# Patient Record
Sex: FEMALE | Race: BLACK OR AFRICAN AMERICAN | Employment: OTHER | URBAN - METROPOLITAN AREA
[De-identification: names, ages, dates, MRNs, and addresses within clinical notes are randomized per-mention and may not be internally consistent; named-entity substitution may affect disease eponyms.]

---

## 2018-09-19 ENCOUNTER — HOSPITAL ENCOUNTER (OUTPATIENT)
Dept: RESPIRATORY THERAPY | Age: 73
Discharge: HOME | End: 2018-09-19
Attending: FAMILY MEDICINE
Payer: MEDICARE

## 2018-09-19 DIAGNOSIS — R06.00 DYSPNEA AND RESPIRATORY ABNORMALITY: ICD-10-CM

## 2018-09-19 DIAGNOSIS — R06.89 DYSPNEA AND RESPIRATORY ABNORMALITY: ICD-10-CM

## 2018-09-19 PROCEDURE — 94726 PLETHYSMOGRAPHY LUNG VOLUMES: CPT

## 2018-09-19 PROCEDURE — 94729 DIFFUSING CAPACITY: CPT

## 2018-09-19 PROCEDURE — 94010 BREATHING CAPACITY TEST: CPT

## 2019-01-10 ENCOUNTER — OFFICE VISIT (OUTPATIENT)
Dept: ORTHOPEDIC SURGERY | Facility: CLINIC | Age: 74
End: 2019-01-10

## 2019-01-10 VITALS
SYSTOLIC BLOOD PRESSURE: 131 MMHG | WEIGHT: 187 LBS | RESPIRATION RATE: 16 BRPM | TEMPERATURE: 97.5 F | HEIGHT: 66 IN | DIASTOLIC BLOOD PRESSURE: 84 MMHG | HEART RATE: 82 BPM | BODY MASS INDEX: 30.05 KG/M2 | OXYGEN SATURATION: 99 %

## 2019-01-10 DIAGNOSIS — M25.561 RIGHT KNEE PAIN, UNSPECIFIED CHRONICITY: ICD-10-CM

## 2019-01-10 DIAGNOSIS — M17.5 OTHER SECONDARY OSTEOARTHRITIS OF RIGHT KNEE: Primary | ICD-10-CM

## 2019-01-10 DIAGNOSIS — M06.9 RHEUMATOID ARTHRITIS INVOLVING RIGHT KNEE, UNSPECIFIED RHEUMATOID FACTOR PRESENCE: ICD-10-CM

## 2019-01-10 RX ORDER — FLUCONAZOLE 150 MG/1
TABLET ORAL
COMMUNITY
Start: 2018-11-01

## 2019-01-10 RX ORDER — ZOLPIDEM TARTRATE 10 MG/1
TABLET ORAL
Refills: 1 | COMMUNITY
Start: 2018-12-21

## 2019-01-10 RX ORDER — NIFEDIPINE 30 MG/1
TABLET, FILM COATED, EXTENDED RELEASE ORAL
COMMUNITY
Start: 2019-01-10

## 2019-01-10 RX ORDER — DIPHENHYDRAMINE HCL 25 MG
25 CAPSULE ORAL
COMMUNITY

## 2019-01-10 RX ORDER — TRIAMCINOLONE ACETONIDE 1 MG/G
CREAM TOPICAL
COMMUNITY
Start: 2018-12-27

## 2019-01-10 RX ORDER — BETAMETHASONE SODIUM PHOSPHATE AND BETAMETHASONE ACETATE 3; 3 MG/ML; MG/ML
6 INJECTION, SUSPENSION INTRA-ARTICULAR; INTRALESIONAL; INTRAMUSCULAR; SOFT TISSUE ONCE
Qty: 0.5 ML | Refills: 0
Start: 2019-01-10 | End: 2019-01-10

## 2019-01-10 RX ORDER — ATORVASTATIN CALCIUM 20 MG/1
TABLET, FILM COATED ORAL
COMMUNITY
Start: 2018-11-01

## 2019-01-10 RX ORDER — BUPIVACAINE HYDROCHLORIDE 2.5 MG/ML
4 INJECTION, SOLUTION EPIDURAL; INFILTRATION; INTRACAUDAL ONCE
Qty: 4 ML | Refills: 0
Start: 2019-01-10 | End: 2019-01-10

## 2019-01-10 RX ORDER — PANTOPRAZOLE SODIUM 40 MG/1
TABLET, DELAYED RELEASE ORAL
COMMUNITY
Start: 2018-12-11

## 2019-01-10 RX ORDER — ASPIRIN 81 MG/1
TABLET ORAL DAILY
COMMUNITY

## 2019-01-10 RX ORDER — NABUMETONE 750 MG/1
TABLET, FILM COATED ORAL
Refills: 1 | COMMUNITY
Start: 2018-10-15

## 2019-01-10 RX ORDER — METRONIDAZOLE 500 MG/1
TABLET ORAL
COMMUNITY
Start: 2018-11-01

## 2019-01-10 RX ORDER — NAPROXEN 375 MG/1
TABLET ORAL
COMMUNITY
Start: 2018-10-23

## 2019-01-10 RX ORDER — METHYLPREDNISOLONE 4 MG/1
TABLET ORAL
COMMUNITY
Start: 2018-12-13 | End: 2019-03-25 | Stop reason: ALTCHOICE

## 2019-01-10 NOTE — PROGRESS NOTES
Patient: Chelsea Bah                MRN: [id number]       SSN: [id number] YOB: 1945        AGE: 75 y.o. SEX: female PCP: Nohemy Canada MD 
01/10/19 Chief Complaint Patient presents with  Knee Pain Right knee pain HISTORY:  Chelsea Bah is a 75 y.o. female who is seen for right knee pain. She has been experiencing right knee pain for the past few years. She reports no h/o injury. Sometimes her swelling and stiffness is so severe that she cannot walk. She reports relief with elevation. She has pain ascending/descending stairs. She reports no difficulty with everyday activities. She has trouble rising out of a chair. She has pain with getting in and out of a car. She has significant startup pain. She is s/p left knee arthroscopy in 2006 with significant relief. She was previously seen for right shoulder pain. The pain has been ongoing for several months. She was receiving cortisone from her PCP Dr. Javier Ortiz. She has a h/o of RA. She is currently taking methotrexate for RA prescribed by Dr. Shruti Mercado. She has a sharp pain that runs down her arm into her hand. She has pain with movement. Pain Assessment  1/10/2019 Location of Pain Knee Location Modifiers Right Severity of Pain 2 Quality of Pain Grinding; Fronie Liborio; Aching Duration of Pain Persistent Frequency of Pain Constant Aggravating Factors Bending;Walking;Standing Limiting Behavior Yes Relieving Factors Nothing Result of Injury No  
 
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 She lives in [place] with her . She has not been exercising recently. She is right handed. She has an adult daughter. She has 2 grandsons, one grand daughter, and 2 great grand daughters. She recently gained 4 lbs. Last 3 Recorded Weights in this Encounter 01/10/19 1344 Weight: 187 lb (84.8 kg) Body mass index is 30.18 kg/m². Patient Active Problem List  
Diagnosis Code  Abdominal pain R10.9  GERD (gastroesophageal reflux disease) K21.9 REVIEW OF SYSTEMS: All Below are Negative except: See HPI Constitutional: negative for fever, chills, and weight loss. Cardiovascular: negative for chest pain, claudication, leg swelling, SOB, CHEN Gastrointestinal: Negative for pain, N/V/C/D, Blood in stool or urine, dysuria,  hematuria, incontinence, pelvic pain. Musculoskeletal: See HPI Neurological: Negative for dizziness and weakness. Negative for headaches, Visual changes, confusion, seizures Phychiatric/Behavioral: Negative for depression, memory loss, substance  abuse. Extremities: Negative for hair changes, rash, or skin lesion changes. Hematologic: Negative for bleeding problems, bruising, pallor or swollen lymph  nodes Peripheral Vascular: No calf pain, no circulation deficits. Social History Socioeconomic History  Marital status: SINGLE Spouse name: Not on file  Number of children: Not on file  Years of education: Not on file  Highest education level: Not on file Social Needs  Financial resource strain: Not on file  Food insecurity - worry: Not on file  Food insecurity - inability: Not on file  Transportation needs - medical: Not on file  Transportation needs - non-medical: Not on file Occupational History  Not on file Tobacco Use  Smoking status: Never Smoker  Smokeless tobacco: Never Used Substance and Sexual Activity  Alcohol use: Yes Alcohol/week: 0.0 oz  Drug use: Not on file  Sexual activity: Not on file Other Topics Concern  Not on file Social History Narrative  Not on file Allergies Allergen Reactions  Pcn [Penicillins] Unknown (comments)  Sulfa (Sulfonamide Antibiotics) Unknown (comments) Current Outpatient Medications Medication Sig  
  atorvastatin (LIPITOR) 20 mg tablet  nabumetone (RELAFEN) 750 mg tablet TAKE 1 TABLET BY MOUTH EVERY 12 HOURS  zolpidem (AMBIEN) 10 mg tablet TAKE 1 TABLET BY MOUTH EVERY DAY AS NEEDED FOR INSOMNIA  pantoprazole (PROTONIX) 40 mg tablet  NIFEdipine ER (ADALAT CC) 30 mg ER tablet  diphenhydrAMINE (BENADRYL) 25 mg capsule Take 25 mg by mouth every six (6) hours as needed.  aspirin delayed-release 81 mg tablet Take  by mouth daily.  betamethasone (CELESTONE SOLUSPAN) 6 mg/mL injection 1 mL by Intra artICUlar route once for 1 dose.  bupivacaine, PF, (MARCAINE, PF,) 0.25 % (2.5 mg/mL) injection 4 mL by Intra artICUlar route once for 1 dose.  ranitidine (ZANTAC) 150 mg tablet Take 150 mg by mouth two (2) times a day.  folic acid (FOLVITE) 1 mg tablet Take  by mouth daily.  methotrexate (RHEUMATREX) 2.5 mg tablet Take 2.5 mg by mouth.  DULoxetine (CYMBALTA) 30 mg capsule Take 30 mg by mouth daily.  fluconazole (DIFLUCAN) 150 mg tablet  methylPREDNISolone (MEDROL DOSEPACK) 4 mg tablet  metroNIDAZOLE (FLAGYL) 500 mg tablet  naproxen (NAPROSYN) 375 mg tablet  triamcinolone acetonide (KENALOG) 0.1 % topical cream   
 dicyclomine (BENTYL) 20 mg tablet 1 tab po q 6 h prn abdominal pain  HYDROcodone-acetaminophen (NORCO) 5-325 mg per tablet Take 1-2 tablets PO every 4-6 hours as needed for pain control. If over the counter ibuprofen or acetaminophen was suggested, then only take the vicodin for pain not well controlled with the over the counter medication.  buPROPion (WELLBUTRIN) 100 mg tablet Take 100 mg by mouth.  HYDROcodone-acetaminophen (LORTAB) 7.5-500 mg per tablet Take 1 Tab by mouth every four (4) hours as needed for Pain. No current facility-administered medications for this visit. PHYSICAL EXAMINATION: 
Visit Vitals /84 (BP 1 Location: Left arm, BP Patient Position: Sitting) Pulse 82 Temp 97.5 °F (36.4 °C) (Oral) Resp 16 Ht 5' 6\" (1.676 m) Wt 187 lb (84.8 kg) SpO2 99% BMI 30.18 kg/m² ORTHO EXAMINATION: 
 
Examination Right shoulder Skin Intact Effusion - Biceps deformity - Atrophy -  
AC joint tenderness - Acromial tenderness + Biceps tenderness - Forward flexion/Elevation  Active abduction  External rotation ROM 10 Internal rotation ROM 70 Apprehension - Impingement -  
Drop Arm Test -  
Neurovascular Intact Examination Right knee Left knee Skin Intact 2 well healed barely visible arthroscopy portals Range of motion 120-0 120-0 Effusion - - Medial joint line tenderness + - Lateral joint line tenderness + - Popliteal tenderness - -  
Osteophytes palpable + - Nicolass - - Patella crepitus + - Anterior drawer - - Lateral laxity - - Medial laxity - - Varus deformity - -  
Valgus deformity - - Pretibial edema - - Calf tenderness - - Diffuse enlargement of right knee PROCEDURE: Patient's right knee was injected with 4 cc Marcaine and 1/2 cc Celestone. Chart reviewed for the following: 
 José Zavala MD, have reviewed the History, Physical and updated the Allergic reactions for [facility] TIME OUT performed immediately prior to start of procedure: 
José Zavala MD, have performed the following reviews on [facility] prior to the start of the procedure: 
         
* Patient was identified by name and date of birth * Agreement on procedure being performed was verified * Risks and Benefits explained to the patient * Procedure site verified and marked as necessary * Patient was positioned for comfort * Consent was obtained Time: 2:12 PM  
 
Date of procedure: 1/10/2019 Procedure performed by:  Christine Lara MD 
 
MsMikala Betty Bingham tolerated the procedure well with no complications. RADIOGRAPHS:   
XR RIGHT KNEE 1/10/19 IVONNE IMPRESSION:  Three views - No fractures, no effusion, moderately severe lateral especially on tunnel view joint space narrowing, + osteophytes present. IKDC Grade C 
 
XR RIGHT SHOULDER PREVIOUS Three views of right shoulder: no fractures, acromioclavicular narrowing, no glenohumeral narrowing,calcific densities. Katie Loose IMPRESSION:   
  ICD-10-CM ICD-9-CM 1. Other secondary osteoarthritis of right knee M17.5 715.26 betamethasone (CELESTONE SOLUSPAN) 6 mg/mL injection BETAMETHASONE ACETATE & SODIUM PHOSPHATE INJECTION 3 MG EA.  
   DRAIN/INJECT LARGE JOINT/BURSA  
   bupivacaine, PF, (MARCAINE, PF,) 0.25 % (2.5 mg/mL) injection 2. Right knee pain, unspecified chronicity M25.561 719.46 AMB POC X-RAY KNEE 3 VIEW  
   betamethasone (CELESTONE SOLUSPAN) 6 mg/mL injection BETAMETHASONE ACETATE & SODIUM PHOSPHATE INJECTION 3 MG EA.  
   DRAIN/INJECT LARGE JOINT/BURSA  
   bupivacaine, PF, (MARCAINE, PF,) 0.25 % (2.5 mg/mL) injection 3. Rheumatoid arthritis involving right knee, unspecified rheumatoid factor presence (HCC) M06.9 714.0 betamethasone (CELESTONE SOLUSPAN) 6 mg/mL injection BETAMETHASONE ACETATE & SODIUM PHOSPHATE INJECTION 3 MG EA.  
   DRAIN/INJECT LARGE JOINT/BURSA  
   bupivacaine, PF, (MARCAINE, PF,) 0.25 % (2.5 mg/mL) injection PLAN:  Patient's right knee was injected with 4 cc Marcaine and 1/2 cc Celestone. There is no need for surgery at this time. Consider visco supplementation if pain continues. She will follow up as needed.   
 
Scribed by Fany Villegas ([address]) as dictated by Eric Cevallos MD

## 2019-02-08 ENCOUNTER — OFFICE VISIT (OUTPATIENT)
Dept: ORTHOPEDIC SURGERY | Facility: CLINIC | Age: 74
End: 2019-02-08

## 2019-02-08 VITALS
HEIGHT: 66 IN | SYSTOLIC BLOOD PRESSURE: 110 MMHG | WEIGHT: 184 LBS | RESPIRATION RATE: 16 BRPM | HEART RATE: 120 BPM | TEMPERATURE: 98.4 F | OXYGEN SATURATION: 97 % | BODY MASS INDEX: 29.57 KG/M2 | DIASTOLIC BLOOD PRESSURE: 81 MMHG

## 2019-02-08 DIAGNOSIS — M06.9 RHEUMATOID ARTHRITIS INVOLVING RIGHT KNEE, UNSPECIFIED RHEUMATOID FACTOR PRESENCE: ICD-10-CM

## 2019-02-08 DIAGNOSIS — M19.011 PRIMARY OSTEOARTHRITIS OF RIGHT SHOULDER: ICD-10-CM

## 2019-02-08 DIAGNOSIS — G89.29 CHRONIC RIGHT SHOULDER PAIN: ICD-10-CM

## 2019-02-08 DIAGNOSIS — M54.2 NECK PAIN: ICD-10-CM

## 2019-02-08 DIAGNOSIS — M75.51 ACUTE BURSITIS OF RIGHT SHOULDER: ICD-10-CM

## 2019-02-08 DIAGNOSIS — M54.12 CERVICAL RADICULOPATHY: Primary | ICD-10-CM

## 2019-02-08 DIAGNOSIS — H93.11 TINNITUS OF RIGHT EAR: ICD-10-CM

## 2019-02-08 DIAGNOSIS — M17.5 OTHER SECONDARY OSTEOARTHRITIS OF RIGHT KNEE: ICD-10-CM

## 2019-02-08 DIAGNOSIS — M25.561 RIGHT KNEE PAIN, UNSPECIFIED CHRONICITY: ICD-10-CM

## 2019-02-08 DIAGNOSIS — M25.511 CHRONIC RIGHT SHOULDER PAIN: ICD-10-CM

## 2019-02-08 RX ORDER — BETAMETHASONE SODIUM PHOSPHATE AND BETAMETHASONE ACETATE 3; 3 MG/ML; MG/ML
6 INJECTION, SUSPENSION INTRA-ARTICULAR; INTRALESIONAL; INTRAMUSCULAR; SOFT TISSUE ONCE
Qty: 0.5 ML | Refills: 0
Start: 2019-02-08 | End: 2019-02-08

## 2019-02-08 RX ORDER — BUPIVACAINE HYDROCHLORIDE 2.5 MG/ML
4 INJECTION, SOLUTION EPIDURAL; INFILTRATION; INTRACAUDAL ONCE
Qty: 4 ML | Refills: 0
Start: 2019-02-08 | End: 2019-02-08

## 2019-02-08 NOTE — PROGRESS NOTES
Patient: Radames Guillen                MRN: [id number]       SSN: [id number] YOB: 1945        AGE: 75 y.o. SEX: female PCP: Senia Jack MD 
02/08/19 CC: RIGHT SHOULDER AND NECK PAIN 
 
HISTORY:  Radames Guillen is a 75 y.o. female who is seen for right shoulder pain. Her pain radiates from her neck into her right shoulder and R UE for the past year. She received a prior cortisone from her PCP Dr. Nena Camacho. She has a h/o of RA for which she takes methotrexate prescribed by Dr. Cristal Baldwin. She has a sharp pain that runs down her arm into her hand. She has pain with movement. She notes shoulder pain with overhead activities and at night. She has pain and difficulty with routine daily activities. She is right handed. She was previously seen for right knee pain. She has been experiencing right knee pain for the past few years. She reports no h/o injury. Sometimes her swelling and stiffness is so severe that she cannot walk. She reports relief with elevation. She has pain ascending/descending stairs. She reports no difficulty with everyday activities. She has trouble rising out of a chair. She has pain with getting in and out of a car. She has significant startup pain. She is s/p left knee arthroscopy in 2006 with significant relief. Pain Assessment  2/8/2019 Location of Pain Knee Location Modifiers Right Severity of Pain 2 Quality of Pain Dull;Aching Duration of Pain A few minutes Frequency of Pain Several times daily Aggravating Factors Walking;Standing Limiting Behavior No  
Relieving Factors Rest  
Result of Injury No  
 
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 She lives in [place] with her . She has not been exercising recently. She is right handed. She has an adult daughter.  She has 2 grandsons, one grand daughter, and 2 great grand daughters. She recently gained 4 lbs. Last 3 Recorded Weights in this Encounter 02/08/19 1455 Weight: 184 lb (83.5 kg) Body mass index is 29.7 kg/m². Patient Active Problem List  
Diagnosis Code  Abdominal pain R10.9  GERD (gastroesophageal reflux disease) K21.9 REVIEW OF SYSTEMS: All Below are Negative except: See HPI Constitutional: negative for fever, chills, and weight loss. Cardiovascular: negative for chest pain, claudication, leg swelling, SOB, CHEN Gastrointestinal: Negative for pain, N/V/C/D, Blood in stool or urine, dysuria,  hematuria, incontinence, pelvic pain. Musculoskeletal: See HPI Neurological: Negative for dizziness and weakness. Negative for headaches, Visual changes, confusion, seizures Phychiatric/Behavioral: Negative for depression, memory loss, substance  abuse. Extremities: Negative for hair changes, rash, or skin lesion changes. Hematologic: Negative for bleeding problems, bruising, pallor or swollen lymph  nodes Peripheral Vascular: No calf pain, no circulation deficits. Social History Socioeconomic History  Marital status: SINGLE Spouse name: Not on file  Number of children: Not on file  Years of education: Not on file  Highest education level: Not on file Social Needs  Financial resource strain: Not on file  Food insecurity - worry: Not on file  Food insecurity - inability: Not on file  Transportation needs - medical: Not on file  Transportation needs - non-medical: Not on file Occupational History  Not on file Tobacco Use  Smoking status: Never Smoker  Smokeless tobacco: Never Used Substance and Sexual Activity  Alcohol use: Yes Alcohol/week: 0.0 oz  Drug use: Not on file  Sexual activity: Not on file Other Topics Concern  Not on file Social History Narrative  Not on file Allergies Allergen Reactions  Pcn [Penicillins] Unknown (comments)  Sulfa (Sulfonamide Antibiotics) Unknown (comments) Current Outpatient Medications Medication Sig  
 atorvastatin (LIPITOR) 20 mg tablet  nabumetone (RELAFEN) 750 mg tablet TAKE 1 TABLET BY MOUTH EVERY 12 HOURS  naproxen (NAPROSYN) 375 mg tablet  zolpidem (AMBIEN) 10 mg tablet TAKE 1 TABLET BY MOUTH EVERY DAY AS NEEDED FOR INSOMNIA  triamcinolone acetonide (KENALOG) 0.1 % topical cream   
 pantoprazole (PROTONIX) 40 mg tablet  NIFEdipine ER (ADALAT CC) 30 mg ER tablet  diphenhydrAMINE (BENADRYL) 25 mg capsule Take 25 mg by mouth every six (6) hours as needed.  aspirin delayed-release 81 mg tablet Take  by mouth daily.  ranitidine (ZANTAC) 150 mg tablet Take 150 mg by mouth two (2) times a day.  folic acid (FOLVITE) 1 mg tablet Take  by mouth daily.  buPROPion (WELLBUTRIN) 100 mg tablet Take 100 mg by mouth.  methotrexate (RHEUMATREX) 2.5 mg tablet Take 2.5 mg by mouth.  DULoxetine (CYMBALTA) 30 mg capsule Take 30 mg by mouth daily.  HYDROcodone-acetaminophen (LORTAB) 7.5-500 mg per tablet Take 1 Tab by mouth every four (4) hours as needed for Pain.  fluconazole (DIFLUCAN) 150 mg tablet  methylPREDNISolone (MEDROL DOSEPACK) 4 mg tablet  metroNIDAZOLE (FLAGYL) 500 mg tablet  dicyclomine (BENTYL) 20 mg tablet 1 tab po q 6 h prn abdominal pain  HYDROcodone-acetaminophen (NORCO) 5-325 mg per tablet Take 1-2 tablets PO every 4-6 hours as needed for pain control. If over the counter ibuprofen or acetaminophen was suggested, then only take the vicodin for pain not well controlled with the over the counter medication. No current facility-administered medications for this visit. PHYSICAL EXAMINATION: 
Visit Vitals /81 (BP 1 Location: Left arm, BP Patient Position: Sitting) Pulse (!) 120 Comment: manual and machine Temp 98.4 °F (36.9 °C) (Oral) Resp 16 Ht 5' 6\" (1.676 m) Wt 184 lb (83.5 kg) SpO2 97% BMI 29.70 kg/m² ORTHO EXAMINATION: 
 
Examination Right shoulder Skin Intact Effusion - Biceps deformity - Atrophy -  
AC joint tenderness - Acromial tenderness + Biceps tenderness - Forward flexion/Elevation  Active abduction  External rotation ROM 10 Internal rotation ROM 70 Apprehension - Impingement -  
Drop Arm Test -  
Neurovascular Intact Trouble getting shoulder out of shirt Examination Neck Skin Intact Tenderness + right paracervical  
Tightness + Flexion Decreased 25% Extension Decreased 25% Lateral bend left Normal  
Lateral bend right Normal  
Masses - Biceps reflex Normal  
Triceps reflex Normal  
Brachioradialis reflex Normal  
 
Examination Right knee Left knee Skin Intact 2 well healed barely visible arthroscopy portals Range of motion 120-0 120-0 Effusion - - Medial joint line tenderness + - Lateral joint line tenderness + - Popliteal tenderness - -  
Osteophytes palpable + - Nicolass - - Patella crepitus + - Anterior drawer - - Lateral laxity - - Medial laxity - - Varus deformity - -  
Valgus deformity - - Pretibial edema - - Calf tenderness - - Diffuse enlargement of right knee Chart reviewed for the following: 
 Shefali Potts MD, have reviewed the History, Physical and updated the Allergic reactions for [facility] TIME OUT performed immediately prior to start of procedure: 
Shefali Potts MD, have performed the following reviews on [facility] prior to the start of the procedure: 
         
* Patient was identified by name and date of birth * Agreement on procedure being performed was verified * Risks and Benefits explained to the patient * Procedure site verified and marked as necessary * Patient was positioned for comfort * Consent was obtained Time: 2:12 PM  
 
Date of procedure: 2/8/2019 Procedure performed by:  Babatunde Nesbitt MD 
 
 Ms. Meri Awad tolerated the procedure well with no complications. RADIOGRAPHS:   
XR RIGHT KNEE 1/10/19 IVONNE IMPRESSION:  Three views - No fractures, no effusion, moderately severe lateral especially on tunnel view joint space narrowing, + osteophytes present. IKDC Grade C 
 
XR RIGHT SHOULDER IVONNE 2/19/16 Three views of right shoulder: no fractures, acromioclavicular narrowing, no glenohumeral narrowing,calcific densities. IMPRESSION:   
  ICD-10-CM ICD-9-CM 1. Cervical radiculopathy M54.12 723.4 REFERRAL TO SPINE SURGERY  
   betamethasone (CELESTONE SOLUSPAN) 6 mg/mL injection BETAMETHASONE ACETATE & SODIUM PHOSPHATE INJECTION 3 MG EA. THER/PROPH/DIAG INJECTION, SUBCUT/IM  
   bupivacaine, PF, (MARCAINE, PF,) 0.25 % (2.5 mg/mL) injection REFERRAL TO PHYSICAL THERAPY 2. Other secondary osteoarthritis of right knee M17.5 715.26 REFERRAL TO PHYSICAL THERAPY 3. Rheumatoid arthritis involving right knee, unspecified rheumatoid factor presence (HCC) M06.9 714.0 REFERRAL TO PHYSICAL THERAPY 4. Right knee pain, unspecified chronicity M25.561 719.46 REFERRAL TO PHYSICAL THERAPY 5. Tinnitus of right ear H93.11 388.30   
6. Acute bursitis of right shoulder M75.51 726.10 betamethasone (CELESTONE SOLUSPAN) 6 mg/mL injection BETAMETHASONE ACETATE & SODIUM PHOSPHATE INJECTION 3 MG EA.  
   DRAIN/INJECT LARGE JOINT/BURSA  
   bupivacaine, PF, (MARCAINE, PF,) 0.25 % (2.5 mg/mL) injection REFERRAL TO PHYSICAL THERAPY 7. Primary osteoarthritis of right shoulder M19.011 715.11 betamethasone (CELESTONE SOLUSPAN) 6 mg/mL injection BETAMETHASONE ACETATE & SODIUM PHOSPHATE INJECTION 3 MG EA.  
   DRAIN/INJECT LARGE JOINT/BURSA  
   bupivacaine, PF, (MARCAINE, PF,) 0.25 % (2.5 mg/mL) injection REFERRAL TO PHYSICAL THERAPY 8. Chronic right shoulder pain M25.511 719.41 betamethasone (CELESTONE SOLUSPAN) 6 mg/mL injection G89.29 338.29 BETAMETHASONE ACETATE & SODIUM PHOSPHATE INJECTION 3 MG EA.  
   DRAIN/INJECT LARGE JOINT/BURSA  
   bupivacaine, PF, (MARCAINE, PF,) 0.25 % (2.5 mg/mL) injection REFERRAL TO PHYSICAL THERAPY 9. Neck pain M54.2 723.1 REFERRAL TO SPINE SURGERY  
   betamethasone (CELESTONE SOLUSPAN) 6 mg/mL injection BETAMETHASONE ACETATE & SODIUM PHOSPHATE INJECTION 3 MG EA. THER/PROPH/DIAG INJECTION, SUBCUT/IM  
   bupivacaine, PF, (MARCAINE, PF,) 0.25 % (2.5 mg/mL) injection REFERRAL TO PHYSICAL THERAPY PLAN:  Patient's right shoulder and paracervical regions were injected with 4 cc Marcaine and 1/2 cc Celestone. There is no need for surgery at this time. She will start a brief course of outpatient physical therapy. There is no need for surgery at this time. She will follow up at the spine center.   
 
Scribed by Heidi Aly ([facility]) as dictated by Maxx Sweeney MD

## 2019-03-25 ENCOUNTER — OFFICE VISIT (OUTPATIENT)
Dept: ORTHOPEDIC SURGERY | Age: 74
End: 2019-03-25

## 2019-03-25 VITALS
RESPIRATION RATE: 16 BRPM | OXYGEN SATURATION: 98 % | BODY MASS INDEX: 29.83 KG/M2 | WEIGHT: 185.6 LBS | HEIGHT: 66 IN | SYSTOLIC BLOOD PRESSURE: 126 MMHG | TEMPERATURE: 98 F | HEART RATE: 103 BPM | DIASTOLIC BLOOD PRESSURE: 68 MMHG

## 2019-03-25 DIAGNOSIS — M25.511 CHRONIC RIGHT SHOULDER PAIN: ICD-10-CM

## 2019-03-25 DIAGNOSIS — M54.2 NECK PAIN: ICD-10-CM

## 2019-03-25 DIAGNOSIS — M54.2 CERVICAL PAIN: Primary | ICD-10-CM

## 2019-03-25 DIAGNOSIS — M79.18 CERVICAL MYOFASCIAL PAIN SYNDROME: ICD-10-CM

## 2019-03-25 DIAGNOSIS — H93.11: ICD-10-CM

## 2019-03-25 DIAGNOSIS — G89.29 CHRONIC RIGHT SHOULDER PAIN: ICD-10-CM

## 2019-03-25 NOTE — PROGRESS NOTES
[address] 
[address], [address] [place], [address] [place] [address] Street Phone: [phone] Fax: [phone] Kirill Anne : 1945 PCP: Alysa Haile MD 
3/25/2019 NEW PATIENT HISTORY OF PRESENT ILLNESS Ty Caruso is a 75 y.o. female c/o neck pain radiating into her right shoulder. She notes that she previously had a separate right shoulder pathology, for which she has been seeing Dr. Bethany Rico. She has also had instances of ringing in her right ear, especially if she is lying on her right side, and she was evaluated and cleared by an ENT. Afterwards, she began experiencing her neck pain. She has found some relief from using heat. She continues to have occasional limited ROM of her right shoulder. She has found some relief from trigger point injections and a right shoulder injection provided by Dr. Bethany Rico. She has h/o RA and osteoarthritis. She also c/o chronic low back pain that is exacerbated with vacuuming. She rates her pain as a 2/10 today. ASSESSMENT Her pain is likely myofascial in nature, compensatory for her right shoulder pain. She is neurologically intact. PLAN 1. Referral to PT with dry needling. Pt will f/u in 6 weeks or sooner if needed. Diagnoses and all orders for this visit: 1. Cervical pain -     AMB POC XRAY, SPINE, CERVICAL; 2 OR 3 
-     REFERRAL TO PHYSICAL THERAPY 2. Cervical myofascial pain syndrome 
-     REFERRAL TO PHYSICAL THERAPY 3. Neck pain 
-     REFERRAL TO PHYSICAL THERAPY 4. Chronic right shoulder pain 
-     REFERRAL TO PHYSICAL THERAPY 5. Trigger point with tinnitus of right ear 
-     REFERRAL TO PHYSICAL THERAPY CHIEF COMPLAINT Ty Caruso is seen today in consultation at the request of Alysa Haile MD for complaints of neck and right shoulder pain. PAST MEDICAL HISTORY Past Medical History:  
Diagnosis Date  Arthritis  Cancer ([facility] [address] breast  
 Depression  GERD (gastroesophageal reflux disease)  Low back pain  Osteopenia Past Surgical History:  
Procedure Laterality Date  BREAST SURGERY PROCEDURE UNLISTED  lumpectomy MEDICATIONS Current Outpatient Medications Medication Sig Dispense Refill  atorvastatin (LIPITOR) 20 mg tablet  fluconazole (DIFLUCAN) 150 mg tablet  methylPREDNISolone (MEDROL DOSEPACK) 4 mg tablet  metroNIDAZOLE (FLAGYL) 500 mg tablet  nabumetone (RELAFEN) 750 mg tablet TAKE 1 TABLET BY MOUTH EVERY 12 HOURS  1  
 naproxen (NAPROSYN) 375 mg tablet  zolpidem (AMBIEN) 10 mg tablet TAKE 1 TABLET BY MOUTH EVERY DAY AS NEEDED FOR INSOMNIA  1  triamcinolone acetonide (KENALOG) 0.1 % topical cream     
 pantoprazole (PROTONIX) 40 mg tablet  NIFEdipine ER (ADALAT CC) 30 mg ER tablet  diphenhydrAMINE (BENADRYL) 25 mg capsule Take 25 mg by mouth every six (6) hours as needed.  aspirin delayed-release 81 mg tablet Take  by mouth daily.  dicyclomine (BENTYL) 20 mg tablet 1 tab po q 6 h prn abdominal pain 30 tablet 0  
 HYDROcodone-acetaminophen (NORCO) 5-325 mg per tablet Take 1-2 tablets PO every 4-6 hours as needed for pain control. If over the counter ibuprofen or acetaminophen was suggested, then only take the vicodin for pain not well controlled with the over the counter medication. 30 Tab 0  
 ranitidine (ZANTAC) 150 mg tablet Take 150 mg by mouth two (2) times a day.  folic acid (FOLVITE) 1 mg tablet Take  by mouth daily.  buPROPion (WELLBUTRIN) 100 mg tablet Take 100 mg by mouth.  methotrexate (RHEUMATREX) 2.5 mg tablet Take 2.5 mg by mouth.  DULoxetine (CYMBALTA) 30 mg capsule Take 30 mg by mouth daily.  HYDROcodone-acetaminophen (LORTAB) 7.5-500 mg per tablet Take 1 Tab by mouth every four (4) hours as needed for Pain. 20 Tab 0 ALLERGIES Allergies Allergen Reactions  Pcn [Penicillins] Unknown (comments)  Sulfa (Sulfonamide Antibiotics) Unknown (comments) SOCIAL HISTORY Social History Socioeconomic History  Marital status: SINGLE Spouse name: Not on file  Number of children: Not on file  Years of education: Not on file  Highest education level: Not on file Tobacco Use  Smoking status: Never Smoker  Smokeless tobacco: Never Used Substance and Sexual Activity  Alcohol use: Yes Alcohol/week: 0.0 oz FAMILY HISTORY No family history on file. REVIEW OF SYSTEMS Review of Systems Musculoskeletal: Positive for neck pain. Right shoulder pain PHYSICAL EXAMINATION Visit Vitals /68 Pulse (!) 103 Temp 98 °F (36.7 °C) (Oral) Resp 16 Ht 5' 6\" (1.676 m) Wt 185 lb 9.6 oz (84.2 kg) SpO2 98% BMI 29.96 kg/m² Pain Assessment  3/25/2019 Location of Pain Neck Location Modifiers - Severity of Pain 2 Quality of Pain Aching; Throbbing Duration of Pain - Frequency of Pain Constant Aggravating Factors Standing;Walking;Bending;Stretching Limiting Behavior -  
Relieving Factors Rest  
Result of Injury - Constitutional:  Well developed, well nourished, in no acute distress. Psychiatric: Affect and mood are appropriate. HEENT: Normocephalic, atraumatic. Extraocular movements intact. Integumentary: No rashes or abrasions noted on exposed areas. Cardiovascular: Regular rate and rhythm. Pulmonary: Clear to auscultation bilaterally. SPINE/MUSCULOSKELETAL EXAM 
 
Cervical spine: 
Neck is midline. Normal muscle tone. No focal atrophy is noted. ROM pain free. Shoulder ROM intact. Tenderness to palpation of right trapezius and sternocleidomastoid. Negative Spurling's sign. Negative Tinel's sign. Negative Sun's sign. Sensation in the bilateral arms grossly intact to light touch. Lumbar spine: No rash, ecchymosis, or gross obliquity. No fasciculations. No focal atrophy is noted. No pain with hip ROM. Full range of motion. No tenderness to palpation. No tenderness to palpation at the sciatic notch. SI joints non-tender. Trochanters non tender. Sensation in the bilateral legs grossly intact to light touch. MOTOR:   
  Biceps  Triceps Deltoids Wrist Ext Wrist Flex Hand Intrin Right 5/5 5/5 5/5 5/5 5/5 5/5 Left 5/5 5/5 5/5 5/5 5/5 5/5 Hip Flex  Quads Hamstrings Ankle DF EHL Ankle PF Right 5/5 5/5 5/5 5/5 5/5 5/5 Left 5/5 5/5 5/5 5/5 5/5 5/5 DTRs are 2+ biceps, triceps, brachioradialis, patella, and Achilles. Negative Straight Leg raise. Squat not tested. No difficulty with tandem gait. Ambulation without assistive device. FWB. RADIOGRAPHS 
2V Cervical XR images taken on 3/25/19 personally reviewed with patient: 
Normal disc spacing Mild facet sclerosis No obvious compression fractures or instabilities.  reviewed Ms. Goodman has a reminder for a \"due or due soon\" health maintenance. I have asked that she contact her primary care provider for follow-up on this health maintenance. 25 minutes of face-to-face contact were spent with the patient during today's visit extensively discussing symptoms and treatment plan. All questions were answered. More than half of this visit today was spent on counseling. Written by Siri Rosales, as dictated by Dr. Jericho Mccoy. I, Dr. Jericho Mccoy, confirm that all documentation is accurate.

## 2019-03-25 NOTE — PATIENT INSTRUCTIONS
Neck Spasm: Exercises Your Care Instructions Here are some examples of typical rehabilitation exercises for your condition. Start each exercise slowly. Ease off the exercise if you start to have pain. Your doctor or physical therapist will tell you when you can start these exercises and which ones will work best for you. How to do the exercises Levator scapula stretch 1. Sit in a firm chair, or stand up straight. 2. Gently tilt your head toward your left shoulder. 3. Turn your head to look down into your armpit, bending your head slightly forward. Let the weight of your head stretch your neck muscles. 4. Hold for 15 to 30 seconds. 5. Return to your starting position. 6. Follow the same instructions above, but tilt your head toward your right shoulder. 7. Repeat 2 to 4 times toward each shoulder. Upper trapezius stretch 1. Sit in a firm chair, or stand up straight. 2. This stretch works best if you keep your shoulder down as you lean away from it. To help you remember to do this, start by relaxing your shoulders and lightly holding on to your thighs or your chair. 3. Tilt your head toward your shoulder and hold for 15 to 30 seconds. Let the weight of your head stretch your muscles. 4. If you would like a little added stretch, place your arm behind your back. Use the arm opposite of the direction you are tilting your head. For example, if you are tilting your head to the left, place your right arm behind your back. 5. Repeat 2 to 4 times toward each shoulder. Neck rotation 1. Sit in a firm chair, or stand up straight. 2. Keeping your chin level, turn your head to the right, and hold for 15 to 30 seconds. 3. Turn your head to the left, and hold for 15 to 30 seconds. 4. Repeat 2 to 4 times to each side. Chin tuck 1. Lie on the floor with a rolled-up towel under your neck. Your head should be touching the floor. 2. Slowly bring your chin toward the front of your neck. 3. Hold for a count of 6, and then relax for up to 10 seconds. 4. Repeat 8 to 12 times. Forward neck flexion 1. Sit in a firm chair, or stand up straight. 2. Bend your head forward. 3. Hold for 15 to 30 seconds, then return to your starting position. 4. Repeat 2 to 4 times. Follow-up care is a key part of your treatment and safety. Be sure to make and go to all appointments, and call your doctor if you are having problems. It's also a good idea to know your test results and keep a list of the medicines you take. Where can you learn more? Go to http://gin-denise.info/. Enter P962 in the search box to learn more about \"Neck Spasm: Exercises. \" Current as of: September 20, 2018 Content Version: 11.9 © 3963-4126 [employer]. Care instructions adapted under license by [employer] (which disclaims liability or warranty for this information). If you have questions about a medical condition or this instruction, always ask your healthcare professional. [address] any warranty or liability for your use of this information. Neck: Exercises Your Care Instructions Here are some examples of typical rehabilitation exercises for your condition. Start each exercise slowly. Ease off the exercise if you start to have pain. Your doctor or physical therapist will tell you when you can start these exercises and which ones will work best for you. How to do the exercises Neck stretch 1. This stretch works best if you keep your shoulder down as you lean away from it. To help you remember to do this, start by relaxing your shoulders and lightly holding on to your thighs or your chair. 2. Tilt your head toward your shoulder and hold for 15 to 30 seconds. Let the weight of your head stretch your muscles.  
3. If you would like a little added stretch, use your hand to gently and steadily pull your head toward your shoulder. For example, keeping your right shoulder down, lean your head to the left. 4. Repeat 2 to 4 times toward each shoulder. Diagonal neck stretch 1. Turn your head slightly toward the direction you will be stretching, and tilt your head diagonally toward your chest and hold for 15 to 30 seconds. 2. If you would like a little added stretch, use your hand to gently and steadily pull your head forward on the diagonal. 
3. Repeat 2 to 4 times toward each side. Dorsal glide stretch 1. Sit or stand tall and look straight ahead. 2. Slowly tuck your chin as you glide your head backward over your body 3. Hold for a count of 6, and then relax for up to 10 seconds. 4. Repeat 8 to 12 times. Chest and shoulder stretch 1. Sit or stand tall and glide your head backward as in the dorsal glide stretch. 2. Raise both arms so that your hands are next to your ears. 3. Take a deep breath, and as you breathe out, lower your elbows down and behind your back. You will feel your shoulder blades slide down and together, and at the same time you will feel a stretch across your chest and the front of your shoulders. 4. Hold for about 6 seconds, and then relax for up to 10 seconds. 5. Repeat 8 to 12 times. Strengthening: Hands on head 1. Move your head backward, forward, and side to side against gentle pressure from your hands, holding each position for about 6 seconds. 2. Repeat 8 to 12 times. Follow-up care is a key part of your treatment and safety. Be sure to make and go to all appointments, and call your doctor if you are having problems. It's also a good idea to know your test results and keep a list of the medicines you take. Where can you learn more? Go to http://gin-denise.info/. Enter P975 in the search box to learn more about \"Neck: Exercises. \" Current as of: September 20, 2018 Content Version: 11.9 © 7445-0435 [employer]. Care instructions adapted under license by [employer] (which disclaims liability or warranty for this information). If you have questions about a medical condition or this instruction, always ask your healthcare professional. [address] any warranty or liability for your use of this information.

## 2019-03-28 ENCOUNTER — HOSPITAL ENCOUNTER (OUTPATIENT)
Dept: BONE DENSITY | Age: 74
Discharge: HOME | End: 2019-03-28
Attending: FAMILY MEDICINE
Payer: MEDICARE

## 2019-03-28 DIAGNOSIS — Z78.0 POSTMENOPAUSAL: ICD-10-CM

## 2019-03-28 PROCEDURE — 77080 DXA BONE DENSITY AXIAL: CPT

## 2019-04-01 ENCOUNTER — HOSPITAL ENCOUNTER (OUTPATIENT)
Dept: PHYSICAL THERAPY | Age: 74
End: 2019-04-01

## 2019-12-15 ENCOUNTER — APPOINTMENT (OUTPATIENT)
Dept: GENERAL RADIOLOGY | Age: 74
End: 2019-12-15
Attending: EMERGENCY MEDICINE
Payer: MEDICARE

## 2019-12-15 ENCOUNTER — HOSPITAL ENCOUNTER (EMERGENCY)
Age: 74
Discharge: HOME | End: 2019-12-15
Attending: EMERGENCY MEDICINE
Payer: MEDICARE

## 2019-12-15 VITALS
SYSTOLIC BLOOD PRESSURE: 141 MMHG | WEIGHT: 190 LBS | HEART RATE: 109 BPM | DIASTOLIC BLOOD PRESSURE: 78 MMHG | HEIGHT: 66 IN | TEMPERATURE: 98.2 F | BODY MASS INDEX: 30.53 KG/M2 | RESPIRATION RATE: 20 BRPM | OXYGEN SATURATION: 93 %

## 2019-12-15 DIAGNOSIS — R73.9 HYPERGLYCEMIA: ICD-10-CM

## 2019-12-15 DIAGNOSIS — J18.9 COMMUNITY ACQUIRED PNEUMONIA OF RIGHT MIDDLE LOBE OF LUNG: Primary | ICD-10-CM

## 2019-12-15 LAB
ANION GAP SERPL CALC-SCNC: 7 MMOL/L (ref 3–18)
BASOPHILS # BLD: 0 K/UL (ref 0–0.1)
BASOPHILS NFR BLD: 1 % (ref 0–2)
BUN SERPL-MCNC: 10 MG/DL (ref 7–18)
BUN/CREAT SERPL: 14 (ref 12–20)
CALCIUM SERPL-MCNC: 8.4 MG/DL (ref 8.5–10.1)
CHLORIDE SERPL-SCNC: 102 MMOL/L (ref 100–111)
CO2 SERPL-SCNC: 26 MMOL/L (ref 21–32)
CREAT SERPL-MCNC: 0.69 MG/DL (ref 0.6–1.3)
DIFFERENTIAL METHOD BLD: ABNORMAL
EOSINOPHIL # BLD: 0.2 K/UL (ref 0–0.4)
EOSINOPHIL NFR BLD: 3 % (ref 0–5)
ERYTHROCYTE [DISTWIDTH] IN BLOOD BY AUTOMATED COUNT: 14.6 % (ref 11.6–14.5)
GLUCOSE SERPL-MCNC: 208 MG/DL (ref 74–99)
HCT VFR BLD AUTO: 41.1 % (ref 35–45)
HGB BLD-MCNC: 13.7 G/DL (ref 12–16)
LYMPHOCYTES # BLD: 0.8 K/UL (ref 0.9–3.6)
LYMPHOCYTES NFR BLD: 15 % (ref 21–52)
MCH RBC QN AUTO: 30.4 PG (ref 24–34)
MCHC RBC AUTO-ENTMCNC: 33.3 G/DL (ref 31–37)
MCV RBC AUTO: 91.3 FL (ref 74–97)
MONOCYTES # BLD: 0.6 K/UL (ref 0.05–1.2)
MONOCYTES NFR BLD: 10 % (ref 3–10)
NEUTS SEG # BLD: 4 K/UL (ref 1.8–8)
NEUTS SEG NFR BLD: 71 % (ref 40–73)
PLATELET # BLD AUTO: 336 K/UL (ref 135–420)
PMV BLD AUTO: 9.4 FL (ref 9.2–11.8)
POTASSIUM SERPL-SCNC: 3.9 MMOL/L (ref 3.5–5.5)
RBC # BLD AUTO: 4.5 M/UL (ref 4.2–5.3)
SODIUM SERPL-SCNC: 135 MMOL/L (ref 136–145)
WBC # BLD AUTO: 5.6 K/UL (ref 4.6–13.2)

## 2019-12-15 PROCEDURE — 96366 THER/PROPH/DIAG IV INF ADDON: CPT

## 2019-12-15 PROCEDURE — 96365 THER/PROPH/DIAG IV INF INIT: CPT

## 2019-12-15 PROCEDURE — 99282 EMERGENCY DEPT VISIT SF MDM: CPT

## 2019-12-15 PROCEDURE — 71046 X-RAY EXAM CHEST 2 VIEWS: CPT

## 2019-12-15 PROCEDURE — 85025 COMPLETE CBC W/AUTO DIFF WBC: CPT

## 2019-12-15 PROCEDURE — 80048 BASIC METABOLIC PNL TOTAL CA: CPT

## 2019-12-15 PROCEDURE — 74011250636 HC RX REV CODE- 250/636: Performed by: EMERGENCY MEDICINE

## 2019-12-15 RX ORDER — BENZONATATE 200 MG/1
200 CAPSULE ORAL
Qty: 20 CAP | Refills: 0 | Status: SHIPPED | OUTPATIENT
Start: 2019-12-15 | End: 2019-12-22

## 2019-12-15 RX ORDER — LEVOFLOXACIN 750 MG/1
750 TABLET ORAL DAILY
Qty: 5 TAB | Refills: 0 | Status: SHIPPED | OUTPATIENT
Start: 2019-12-15

## 2019-12-15 RX ORDER — LEVOFLOXACIN 5 MG/ML
750 INJECTION, SOLUTION INTRAVENOUS
Status: COMPLETED | OUTPATIENT
Start: 2019-12-15 | End: 2019-12-15

## 2019-12-15 RX ORDER — DOXYCYCLINE HYCLATE 100 MG
100 TABLET ORAL 2 TIMES DAILY
Qty: 20 TAB | Refills: 0 | OUTPATIENT
Start: 2019-12-15 | End: 2019-12-15

## 2019-12-15 RX ADMIN — LEVOFLOXACIN 750 MG: 5 INJECTION, SOLUTION INTRAVENOUS at 09:24

## 2019-12-15 NOTE — ED NOTES
Zbigniew Gray is a 75 y.o. female that was discharged in good condition. The patients diagnosis, condition and treatment were explained to  patient and aftercare instructions were given. The patient verbalized understanding. Patient armband removed and shredded.

## 2019-12-15 NOTE — ED TRIAGE NOTES
Pt reports nasal congestion and cough since Thursday. Pt states now having pain due to cough. Pt in NAD.

## 2019-12-15 NOTE — DISCHARGE INSTRUCTIONS
1) Plenty of fluids  2) Tessalon for cough  3) Cool mist vaporizer in bedroom at night  4) Honey 1 tsp as needed for cough  5) Mentholated cough drops as needed  6) Preliminary reading of chest x-ray is positive for pneumonia in the right lung. 7) You were treated with Levaquin 750 mg IV in the emergency department. Continue treatment at home with 750 mg a day orally for 5 days. 8) Recommend repeat chest x-ray in 2-4 weeks to ensure resolution of x-ray findings. Outpatient CT scan of chest for any ongoing findings. 9) Blood glucose (sugar) elevated to 208 (normal less than 100). You will need follow up outpatient studies through your PCP to screen for diabetes. Pneumonia: Care Instructions  Your Care Instructions    Pneumonia is an infection of the lungs. Most cases are caused by infections from bacteria or viruses. Pneumonia may be mild or very severe. If it is caused by bacteria, you will be treated with antibiotics. It may take a few weeks to a few months to recover fully from pneumonia, depending on how sick you were and whether your overall health is good. Follow-up care is a key part of your treatment and safety. Be sure to make and go to all appointments, and call your doctor if you are having problems. It's also a good idea to know your test results and keep a list of the medicines you take. How can you care for yourself at home? · Take your antibiotics exactly as directed. Do not stop taking the medicine just because you are feeling better. You need to take the full course of antibiotics. · Take your medicines exactly as prescribed. Call your doctor if you think you are having a problem with your medicine. · Get plenty of rest and sleep. You may feel weak and tired for a while, but your energy level will improve with time. · To prevent dehydration, drink plenty of fluids, enough so that your urine is light yellow or clear like water.  Choose water and other caffeine-free clear liquids until you feel better. If you have kidney, heart, or liver disease and have to limit fluids, talk with your doctor before you increase the amount of fluids you drink. · Take care of your cough so you can rest. A cough that brings up mucus from your lungs is common with pneumonia. It is one way your body gets rid of the infection. But if coughing keeps you from resting or causes severe fatigue and chest-wall pain, talk to your doctor. He or she may suggest that you take a medicine to reduce the cough. · Use a vaporizer or humidifier to add moisture to your bedroom. Follow the directions for cleaning the machine. · Do not smoke or allow others to smoke around you. Smoke will make your cough last longer. If you need help quitting, talk to your doctor about stop-smoking programs and medicines. These can increase your chances of quitting for good. · Take an over-the-counter pain medicine, such as acetaminophen (Tylenol), ibuprofen (Advil, Motrin), or naproxen (Aleve). Read and follow all instructions on the label. · Do not take two or more pain medicines at the same time unless the doctor told you to. Many pain medicines have acetaminophen, which is Tylenol. Too much acetaminophen (Tylenol) can be harmful. · If you were given a spirometer to measure how well your lungs are working, use it as instructed. This can help your doctor tell how your recovery is going. · To prevent pneumonia in the future, talk to your doctor about getting a flu vaccine (once a year) and a pneumococcal vaccine (one time only for most people). When should you call for help? Call 911 anytime you think you may need emergency care.  For example, call if:    · You have severe trouble breathing.    Call your doctor now or seek immediate medical care if:    · You cough up dark brown or bloody mucus (sputum).     · You have new or worse trouble breathing.     · You are dizzy or lightheaded, or you feel like you may faint.    Watch closely for changes in your health, and be sure to contact your doctor if:    · You have a new or higher fever.     · You are coughing more deeply or more often.     · You are not getting better after 2 days (48 hours).     · You do not get better as expected. Where can you learn more? Go to http://gin-denise.info/. Enter [id number] in the search box to learn more about \"Pneumonia: Care Instructions. \"  Current as of: June 9, 2019  Content Version: 12.2  © 3084-4879 [employer]. Care instructions adapted under license by [employer] (which disclaims liability or warranty for this information). If you have questions about a medical condition or this instruction, always ask your healthcare professional. [address] any warranty or liability for your use of this information.

## 2019-12-15 NOTE — ED PROVIDER NOTES
26-year-old female history of breast cancer, GERD, and arthritis presents for evaluation of cough, cold, and congestion symptoms x4 days.  had similar symptoms earlier in the week and is currently taking antibiotics. No documented fever. Chest is now sore from coughing. Cough is worse at night keeping her awake. Past Medical History:   Diagnosis Date    Arthritis     Cancer ([facility] [address]     breast    Depression     GERD (gastroesophageal reflux disease)     Low back pain     Osteopenia        Past Surgical History:   Procedure Laterality Date    BREAST SURGERY PROCEDURE UNLISTED  lumpectomy         No family history on file. Social History     Socioeconomic History    Marital status: SINGLE     Spouse name: Not on file    Number of children: Not on file    Years of education: Not on file    Highest education level: Not on file   Occupational History    Not on file   Social Needs    Financial resource strain: Not on file    Food insecurity:     Worry: Not on file     Inability: Not on file    Transportation needs:     Medical: Not on file     Non-medical: Not on file   Tobacco Use    Smoking status: Never Smoker    Smokeless tobacco: Never Used   Substance and Sexual Activity    Alcohol use:  Yes     Alcohol/week: 0.0 standard drinks    Drug use: Not on file    Sexual activity: Not on file   Lifestyle    Physical activity:     Days per week: Not on file     Minutes per session: Not on file    Stress: Not on file   Relationships    Social connections:     Talks on phone: Not on file     Gets together: Not on file     Attends Rastafari service: Not on file     Active member of club or organization: Not on file     Attends meetings of clubs or organizations: Not on file     Relationship status: Not on file    Intimate partner violence:     Fear of current or ex partner: Not on file     Emotionally abused: Not on file     Physically abused: Not on file     Forced sexual activity: Not on file   Other Topics Concern    Not on file   Social History Narrative    Not on file         ALLERGIES: Pcn [penicillins] and Sulfa (sulfonamide antibiotics)    Review of Systems   Constitutional: Positive for fatigue. HENT: Positive for congestion and rhinorrhea. Respiratory: Positive for cough. Negative for wheezing. Cardiovascular: Positive for chest pain (w/ cough). All other systems reviewed and are negative. Vitals:    12/15/19 0812 12/15/19 0817   BP:  148/72   Pulse:  (!) 109   Resp:  20   Temp:  98.2 °F (36.8 °C)   SpO2:  95%   Weight: 86.2 kg (190 lb)    Height: 5' 6\" (1.676 m)             Physical Exam  Vitals signs and nursing note reviewed. Constitutional:       General: She is not in acute distress. Appearance: She is well-developed. HENT:      Head: Normocephalic and atraumatic. Comments: Nose congested without purulent drainage. Oropharyngeal exam normal.  Eyes:      General: No scleral icterus. Cardiovascular:      Rate and Rhythm: Regular rhythm. Tachycardia present. Pulmonary:      Effort: Pulmonary effort is normal.      Breath sounds: No wheezing or rales. Comments: Positive for deep hacking cough. Abdominal:      Tenderness: There is no tenderness. Skin:     General: Skin is warm and dry. Neurological:      Mental Status: She is alert and oriented to person, place, and time. CXR interpreted per myself positive for R mid-lung infiltrate    XR CHEST PA LAT   Final Result   IMPRESSION:      1. Abnormalities of the right chest to include perihilar opacities and areas of   pleural thickening in the right lateral chest and right apex. Finding could   represent that of infection to include pneumonia and possible loculated pleural   effusions.  In the absence of clinical symptoms for pneumonia, possibility of   metastatic disease given history of breast cancer or other neoplastic process of   the chest would also be in the differential. Short-term follow-up chest   radiograph after treatment or further evaluation with CT chest could be   obtained. Recent Results (from the past 12 hour(s))   CBC WITH AUTOMATED DIFF    Collection Time: 12/15/19  9:08 AM   Result Value Ref Range    WBC 5.6 4.6 - 13.2 K/uL    RBC 4.50 4. 20 - 5.30 M/uL    HGB 13.7 12.0 - 16.0 g/dL    HCT 41.1 35.0 - 45.0 %    MCV 91.3 74.0 - 97.0 FL    MCH 30.4 24.0 - 34.0 PG    MCHC 33.3 31.0 - 37.0 g/dL    RDW 14.6 (H) 11.6 - 14.5 %    PLATELET 865 965 - 700 K/uL    MPV 9.4 9.2 - 11.8 FL    NEUTROPHILS 71 40 - 73 %    LYMPHOCYTES 15 (L) 21 - 52 %    MONOCYTES 10 3 - 10 %    EOSINOPHILS 3 0 - 5 %    BASOPHILS 1 0 - 2 %    ABS. NEUTROPHILS 4.0 1.8 - 8.0 K/UL    ABS. LYMPHOCYTES 0.8 (L) 0.9 - 3.6 K/UL    ABS. MONOCYTES 0.6 0.05 - 1.2 K/UL    ABS. EOSINOPHILS 0.2 0.0 - 0.4 K/UL    ABS. BASOPHILS 0.0 0.0 - 0.1 K/UL    DF AUTOMATED     METABOLIC PANEL, BASIC    Collection Time: 12/15/19  9:35 AM   Result Value Ref Range    Sodium 135 (L) 136 - 145 mmol/L    Potassium 3.9 3.5 - 5.5 mmol/L    Chloride 102 100 - 111 mmol/L    CO2 26 21 - 32 mmol/L    Anion gap 7 3.0 - 18 mmol/L    Glucose 208 (H) 74 - 99 mg/dL    BUN 10 7.0 - 18 MG/DL    Creatinine 0.69 0.6 - 1.3 MG/DL    BUN/Creatinine ratio 14 12 - 20      GFR est AA >60 >60 ml/min/1.73m2    GFR est non-AA >60 >60 ml/min/1.73m2    Calcium 8.4 (L) 8.5 - 10.1 MG/DL           MDM  Number of Diagnoses or Management Options  Community acquired pneumonia of right middle lobe of lung [facility]):   Diagnosis management comments: Impression: Acute cough and congestion x4 days. Screening chest x-ray obtained, positive for right midlung infiltrate. Levaquin 750 mg IV administered. Patient suitable for outpatient care given absence of fever, normal respiratory rate, and adequate oxygen saturation on room air. Will provide coverage for an additional 5 days with Levaquin orally. CURB-65 Score is 1 for age greater than 72 (low risk). Procedures    Diagnosis:   1. Community acquired pneumonia of right middle lobe of lung ([address]    2. Hyperglycemia      1) Plenty of fluids  2) Tessalon for cough  3) Cool mist vaporizer in bedroom at night  4) Honey 1 tsp as needed for cough  5) Mentholated cough drops as needed  6) Preliminary reading of chest x-ray is positive for pneumonia in the right lung. 7) You were treated with Levaquin 750 mg IV in the emergency department. Continue treatment at home with 750 mg a day orally for 5 days. 8) Recommend repeat chest x-ray in 2-4 weeks to ensure resolution of x-ray findings. Outpatient CT scan of chest for any ongoing findings. 9) Blood glucose (sugar) elevated to 208 (normal less than 100). You will need follow up outpatient studies through your PCP to screen for diabetes. Disposition: home    Follow-up Information     Follow up With Specialties Details Why Contact Norma Weir MD Internal Medicine In 5 weeks for recheck of ongoing symptoms, As needed [facility] [address] [place] [address]  [phone] [id number] [facility] EMERGENCY DEPT Emergency Medicine  As needed, If symptoms worsen [address] [facility]  [phone]          Patient's Medications   Start Taking    BENZONATATE (TESSALON) 200 MG CAPSULE    Take 1 Cap by mouth three (3) times daily as needed for Cough for up to 7 days. LEVOFLOXACIN (LEVAQUIN) 750 MG TABLET    Take 1 Tab by mouth daily. Continue Taking    ASPIRIN DELAYED-RELEASE 81 MG TABLET    Take  by mouth daily. ATORVASTATIN (LIPITOR) 20 MG TABLET        BUPROPION (WELLBUTRIN) 100 MG TABLET    Take 100 mg by mouth. DIPHENHYDRAMINE (BENADRYL) 25 MG CAPSULE    Take 25 mg by mouth every six (6) hours as needed. DULOXETINE (CYMBALTA) 30 MG CAPSULE    Take 30 mg by mouth daily. FLUCONAZOLE (DIFLUCAN) 150 MG TABLET        FOLIC ACID (FOLVITE) 1 MG TABLET    Take  by mouth daily.       METHOTREXATE (RHEUMATREX) 2.5 MG TABLET    Take 2.5 mg by mouth. METRONIDAZOLE (FLAGYL) 500 MG TABLET        NABUMETONE (RELAFEN) 750 MG TABLET    TAKE 1 TABLET BY MOUTH EVERY 12 HOURS    NAPROXEN (NAPROSYN) 375 MG TABLET        NIFEDIPINE ER (ADALAT CC) 30 MG ER TABLET        PANTOPRAZOLE (PROTONIX) 40 MG TABLET        TRIAMCINOLONE ACETONIDE (KENALOG) 0.1 % TOPICAL CREAM        ZOLPIDEM (AMBIEN) 10 MG TABLET    TAKE 1 TABLET BY MOUTH EVERY DAY AS NEEDED FOR INSOMNIA   These Medications have changed    No medications on file   Stop Taking    DICYCLOMINE (BENTYL) 20 MG TABLET    1 tab po q 6 h prn abdominal pain    HYDROCODONE-ACETAMINOPHEN (LORTAB) 7.5-500 MG PER TABLET    Take 1 Tab by mouth every four (4) hours as needed for Pain. HYDROCODONE-ACETAMINOPHEN (NORCO) 5-325 MG PER TABLET    Take 1-2 tablets PO every 4-6 hours as needed for pain control. If over the counter ibuprofen or acetaminophen was suggested, then only take the vicodin for pain not well controlled with the over the counter medication. RANITIDINE (ZANTAC) 150 MG TABLET    Take 150 mg by mouth two (2) times a day.

## 2020-02-17 ENCOUNTER — HOSPITAL ENCOUNTER (OUTPATIENT)
Dept: CT IMAGING | Age: 75
Discharge: HOME | End: 2020-02-17
Attending: INTERNAL MEDICINE
Payer: MEDICARE

## 2020-02-17 DIAGNOSIS — R06.09 DYSPNEA ON EXERTION: ICD-10-CM

## 2020-02-17 LAB — CREAT UR-MCNC: 0.6 MG/DL (ref 0.6–1.3)

## 2020-02-17 PROCEDURE — 74011636320 HC RX REV CODE- 636/320

## 2020-02-17 PROCEDURE — 82565 ASSAY OF CREATININE: CPT

## 2020-02-17 PROCEDURE — 71260 CT THORAX DX C+: CPT

## 2020-02-17 RX ADMIN — IOPAMIDOL 75 ML: 612 INJECTION, SOLUTION INTRAVENOUS at 09:41

## 2020-06-26 ENCOUNTER — HOSPITAL ENCOUNTER (OUTPATIENT)
Dept: PET IMAGING | Age: 75
Discharge: HOME | End: 2020-06-26
Attending: INTERNAL MEDICINE
Payer: MEDICARE

## 2020-06-26 DIAGNOSIS — R59.0 MEDIASTINAL ADENOPATHY: ICD-10-CM

## 2020-06-26 DIAGNOSIS — R91.8 ABNORMAL FINDINGS ON DIAGNOSTIC IMAGING OF LUNG: ICD-10-CM

## 2020-06-26 PROCEDURE — A9552 F18 FDG: HCPCS

## 2020-07-09 ENCOUNTER — HOSPITAL ENCOUNTER (OUTPATIENT)
Dept: ULTRASOUND IMAGING | Age: 75
Discharge: HOME | End: 2020-07-09
Attending: RADIOLOGY | Admitting: RADIOLOGY
Payer: MEDICARE

## 2020-07-09 VITALS
TEMPERATURE: 98.3 F | OXYGEN SATURATION: 99 % | DIASTOLIC BLOOD PRESSURE: 77 MMHG | RESPIRATION RATE: 18 BRPM | BODY MASS INDEX: 29.89 KG/M2 | HEART RATE: 90 BPM | SYSTOLIC BLOOD PRESSURE: 131 MMHG | HEIGHT: 66 IN | WEIGHT: 186 LBS

## 2020-07-09 DIAGNOSIS — R22.2 LUMP IN CHEST: ICD-10-CM

## 2020-07-09 DIAGNOSIS — R22.2 SWELLING IN CHEST: ICD-10-CM

## 2020-07-09 LAB
ANION GAP SERPL CALC-SCNC: 5 MMOL/L (ref 3–18)
APTT PPP: 25.3 SEC (ref 23–36.4)
BUN SERPL-MCNC: 11 MG/DL (ref 7–18)
BUN/CREAT SERPL: 15 (ref 12–20)
CALCIUM SERPL-MCNC: 8.8 MG/DL (ref 8.5–10.1)
CHLORIDE SERPL-SCNC: 108 MMOL/L (ref 100–111)
CO2 SERPL-SCNC: 27 MMOL/L (ref 21–32)
CREAT SERPL-MCNC: 0.73 MG/DL (ref 0.6–1.3)
ERYTHROCYTE [DISTWIDTH] IN BLOOD BY AUTOMATED COUNT: 13.5 % (ref 11.6–14.5)
GLUCOSE SERPL-MCNC: 122 MG/DL (ref 74–99)
HCT VFR BLD AUTO: 38 % (ref 35–45)
HGB BLD-MCNC: 12.9 G/DL (ref 12–16)
INR PPP: 1.1 (ref 0.8–1.2)
MCH RBC QN AUTO: 29.7 PG (ref 24–34)
MCHC RBC AUTO-ENTMCNC: 33.9 G/DL (ref 31–37)
MCV RBC AUTO: 87.6 FL (ref 74–97)
PLATELET # BLD AUTO: 361 K/UL (ref 135–420)
PMV BLD AUTO: 9.6 FL (ref 9.2–11.8)
POTASSIUM SERPL-SCNC: 4.4 MMOL/L (ref 3.5–5.5)
PROTHROMBIN TIME: 13.7 SEC (ref 11.5–15.2)
RBC # BLD AUTO: 4.34 M/UL (ref 4.2–5.3)
SODIUM SERPL-SCNC: 140 MMOL/L (ref 136–145)
WBC # BLD AUTO: 5.8 K/UL (ref 4.6–13.2)

## 2020-07-09 PROCEDURE — 38505 NEEDLE BIOPSY LYMPH NODES: CPT

## 2020-07-09 PROCEDURE — 88334 PATH CONSLTJ SURG CYTO XM EA: CPT

## 2020-07-09 PROCEDURE — 88333 PATH CONSLTJ SURG CYTO XM 1: CPT

## 2020-07-09 PROCEDURE — 85610 PROTHROMBIN TIME: CPT

## 2020-07-09 PROCEDURE — 85730 THROMBOPLASTIN TIME PARTIAL: CPT

## 2020-07-09 PROCEDURE — 88341 IMHCHEM/IMCYTCHM EA ADD ANTB: CPT

## 2020-07-09 PROCEDURE — 85027 COMPLETE CBC AUTOMATED: CPT

## 2020-07-09 PROCEDURE — 88342 IMHCHEM/IMCYTCHM 1ST ANTB: CPT

## 2020-07-09 PROCEDURE — 80048 BASIC METABOLIC PNL TOTAL CA: CPT

## 2020-07-09 PROCEDURE — 88305 TISSUE EXAM BY PATHOLOGIST: CPT

## 2020-07-09 NOTE — PROCEDURES
RADIOLOGY POST PROCEDURE NOTE     July 9, 2020       1:08 PM     Preoperative Diagnosis:   Breast CA. Postoperative Diagnosis:  Same. :      Assistant:  None. Type of Anesthesia: 1% plain lidocaine    Procedure/Description:  Image guided right supraclavicular LN core needle Bx. Findings:   No bleeding. Estimated blood Loss:  Minimal    Specimen Removed:   yes    Blood transfusions:  None. Implants:  None.     Complications: None    Condition: Stable    Discharge Plan:  discharge home     Mac Live MD

## 2020-07-09 NOTE — H&P
OUTPATIENT HISTORY AND PHYSICAL      Today 7/9/2020     Indication/Symptoms:   Irene Mcburney is a 67 y.o. female  With history of right breast CA, lung lesions and rightsupraclavicular metabolic adenopathy here for image guided Bx of right supraclavicular LN    Current Meds:    Prior to Admission medications    Medication Sig Start Date End Date Taking? Authorizing Provider   atorvastatin (LIPITOR) 20 mg tablet  11/1/18  Yes Provider, Historical   pantoprazole (PROTONIX) 40 mg tablet  12/11/18  Yes Provider, Historical   NIFEdipine ER (ADALAT CC) 30 mg ER tablet  1/10/19  Yes Provider, Historical   diphenhydrAMINE (BENADRYL) 25 mg capsule Take 25 mg by mouth every six (6) hours as needed. Yes Provider, Historical   DULoxetine (CYMBALTA) 30 mg capsule Take 30 mg by mouth daily. Yes Other, MD Phil   levoFLOXacin (LEVAQUIN) 750 mg tablet Take 1 Tab by mouth daily. 12/15/19   Jacoby Henderson MD   fluconazole (DIFLUCAN) 150 mg tablet  11/1/18   Provider, Historical   metroNIDAZOLE (FLAGYL) 500 mg tablet  11/1/18   Provider, Historical   nabumetone (RELAFEN) 750 mg tablet TAKE 1 TABLET BY MOUTH EVERY 12 HOURS 10/15/18   Provider, Historical   naproxen (NAPROSYN) 375 mg tablet  10/23/18   Provider, Historical   zolpidem (AMBIEN) 10 mg tablet TAKE 1 TABLET BY MOUTH EVERY DAY AS NEEDED FOR INSOMNIA 12/21/18   Provider, Historical   triamcinolone acetonide (KENALOG) 0.1 % topical cream  12/27/18   Provider, Historical   aspirin delayed-release 81 mg tablet Take  by mouth daily. Provider, Historical   folic acid (FOLVITE) 1 mg tablet Take  by mouth daily. Other, MD Phil   buPROPion (WELLBUTRIN) 100 mg tablet Take 100 mg by mouth. Umesh, MD Phil   methotrexate (RHEUMATREX) 2.5 mg tablet Take 2.5 mg by mouth. Other, MD Phil       Allergies:       Allergies   Allergen Reactions    Pcn [Penicillins] Unknown (comments)    Sulfa (Sulfonamide Antibiotics) Unknown (comments)       Comorbid Conditions:    Past Medical History:   Diagnosis Date    Arthritis     Cancer ([address]     breast    Depression     GERD (gastroesophageal reflux disease)     Low back pain     Osteopenia           Past Surgical History:   Procedure Laterality Date    BREAST SURGERY PROCEDURE UNLISTED  lumpectomy     Data:    Visit Vitals  /65 (BP 1 Location: Left arm, BP Patient Position: At rest)   Pulse 89   Temp 98.4 °F (36.9 °C)   Resp 18   Ht 5' 6\" (1.676 m)   Wt 84.4 kg (186 lb)   SpO2 100%   BMI 30.02 kg/m²   :  Recent Labs     07/09/20  1026        Recent Labs     07/09/20  1026   INR 1.1   APTT 25.3       The H & P and/or progress notes and any available imaging were reviewed. The risks, indications and possible alternatives to the procedure, including doing nothing, were discussed and informed consent was obtained. Physical Exam:      Mental status:   Alert and oriented. Examination specific to the procedure proposed to be performed and any co morbid conditions:   Mallampati classification 2 ,  ASA2   Heart:   RRR. Lungs:   CTAB. No wheezes, rales or rhonchi. The patient is an appropriate candidate to undergo the planned procedure and sedation.     Lexi Quintanilla MD

## 2020-07-09 NOTE — DISCHARGE INSTRUCTIONS
Patient Education           Follow up with Pulmonology in 1 week. Lymph Node Biopsy: What to Expect at Home  Your Recovery     A lymph node biopsy removes lymph node tissue. The tissue is then looked at under a microscope. It will be studied for signs of disease, such as cancer, or signs of infection. At the site where the tissue was removed, you may have:  · Pain. · Tenderness. · Bleeding. · Bruising. During the procedure, your doctor may have used a blue dye, a radioactive material, or both. The dye may give your skin a bluish color for several days after the biopsy. And it may turn your urine green for 1 or 2 days. The radioactive material leaves your body quickly through your urine. The amount of radiation used is very small and won't harm you. This care sheet gives you a general idea about how long it will take for you to recover. But each person recovers at a different pace. Follow the steps below to get better as quickly as possible. How can you care for yourself at home? Activity  · Rest when you feel tired. · If you have an incision (cut) from the procedure, avoid activity or exercise that may put stress on that area. Diet  · You can eat your normal diet. If your stomach is upset, try bland, low-fat foods like plain rice, broiled chicken, toast, and yogurt. Medicines  · Your doctor will tell you if and when you can restart your medicines. He or she will also give you instructions about taking any new medicines. · If you take aspirin or some other blood thinner, be sure to talk to your doctor. He or she will tell you if and when to start taking this medicine again. Make sure that you understand exactly what your doctor wants you to do. · Take pain medicines exactly as directed. Incision care  · If you have strips of tape on an incision, leave them on until they fall off. Hygiene  · When you shower, wash the biopsy area with warm water, and then pat it dry.   Other instructions  · You might have a mild sore throat if a tube was used to help you breathe during the biopsy. Soothe your sore throat with lozenges, and gargle with warm salt water. · Fluid may collect near the biopsy site. And fluid may leak from the biopsy site. Use an ice pack or take an over-the-counter pain medicine to relieve swelling and mild pain. Follow-up care is a key part of your treatment and safety. Be sure to make and go to all appointments, and call your doctor if you are having problems. It's also a good idea to know your test results and keep a list of the medicines you take. When should you call for help? Call  911 anytime you think you may need emergency care. For example, call if:  · You passed out (lost consciousness). · You have chest pain, are short of breath, or cough up blood. Call your doctor now or seek immediate medical care if:  · You have pain that does not get better after you take pain medicine. · You are sick to your stomach or can't drink fluids. · Bright red blood has soaked through the bandage over your incision. · You have symptoms of infection, such as:  ? Increased pain, swelling, warmth, or redness. ? Red streaks leading from the area. ? Pus draining from the area. ? A fever. Watch closely for changes in your health, and be sure to contact your doctor if you have any problems. Where can you learn more? Go to http://gin-denise.info/  Enter P500 in the search box to learn more about \"Lymph Node Biopsy: What to Expect at Home. \"  Current as of: August 22, 2019               Content Version: 12.5  © 7894-2798 [employer]. Care instructions adapted under license by [employer] (which disclaims liability or warranty for this information).  If you have questions about a medical condition or this instruction, always ask your healthcare professional. [place] [address] any warranty or liability for your use of this information.

## 2020-08-11 ENCOUNTER — HOSPITAL ENCOUNTER (OUTPATIENT)
Dept: GENERAL RADIOLOGY | Age: 75
Discharge: HOME | End: 2020-08-11
Payer: MEDICARE

## 2020-08-11 DIAGNOSIS — J90 PLEURAL EFFUSION, NOT ELSEWHERE CLASSIFIED: ICD-10-CM

## 2020-08-11 PROCEDURE — 71046 X-RAY EXAM CHEST 2 VIEWS: CPT

## 2020-08-27 ENCOUNTER — APPOINTMENT (OUTPATIENT)
Dept: GENERAL RADIOLOGY | Age: 75
End: 2020-08-27
Attending: RADIOLOGY
Payer: MEDICARE

## 2020-08-27 ENCOUNTER — HOSPITAL ENCOUNTER (OUTPATIENT)
Dept: ULTRASOUND IMAGING | Age: 75
Discharge: HOME | End: 2020-08-27
Attending: RADIOLOGY | Admitting: RADIOLOGY
Payer: MEDICARE

## 2020-08-27 ENCOUNTER — HOSPITAL ENCOUNTER (OUTPATIENT)
Dept: ULTRASOUND IMAGING | Age: 75
Discharge: HOME | End: 2020-08-27
Attending: SPECIALIST
Payer: MEDICARE

## 2020-08-27 VITALS
HEART RATE: 94 BPM | HEIGHT: 66 IN | SYSTOLIC BLOOD PRESSURE: 127 MMHG | DIASTOLIC BLOOD PRESSURE: 68 MMHG | BODY MASS INDEX: 29.73 KG/M2 | RESPIRATION RATE: 20 BRPM | TEMPERATURE: 97.9 F | OXYGEN SATURATION: 100 % | WEIGHT: 185 LBS

## 2020-08-27 DIAGNOSIS — R59.9 SWELLING OF LYMPH NODES: ICD-10-CM

## 2020-08-27 DIAGNOSIS — J90 PLEURAL EFFUSION: ICD-10-CM

## 2020-08-27 DIAGNOSIS — C50.919 BREAST CANCER (HCC): ICD-10-CM

## 2020-08-27 LAB
ANION GAP SERPL CALC-SCNC: 5 MMOL/L (ref 3–18)
APPEARANCE FLD: ABNORMAL
APTT PPP: 26 SEC (ref 23–36.4)
BUN SERPL-MCNC: 17 MG/DL (ref 7–18)
BUN/CREAT SERPL: 19 (ref 12–20)
CALCIUM SERPL-MCNC: 8.9 MG/DL (ref 8.5–10.1)
CHLORIDE SERPL-SCNC: 104 MMOL/L (ref 100–111)
CO2 SERPL-SCNC: 30 MMOL/L (ref 21–32)
COLOR FLD: ABNORMAL
CREAT SERPL-MCNC: 0.91 MG/DL (ref 0.6–1.3)
EOSINOPHIL NFR FLD MANUAL: 0 %
ERYTHROCYTE [DISTWIDTH] IN BLOOD BY AUTOMATED COUNT: 13.2 % (ref 11.6–14.5)
GLUCOSE FLD-MCNC: 105 MG/DL
GLUCOSE SERPL-MCNC: 109 MG/DL (ref 74–99)
HCT VFR BLD AUTO: 39 % (ref 35–45)
HGB BLD-MCNC: 13.2 G/DL (ref 12–16)
INR PPP: 1 (ref 0.8–1.2)
LDH FLD L TO P-CCNC: 192 U/L
LYMPHOCYTES NFR FLD: 93 %
MACROPHAGES NFR FLD: 6 %
MCH RBC QN AUTO: 29.9 PG (ref 24–34)
MCHC RBC AUTO-ENTMCNC: 33.8 G/DL (ref 31–37)
MCV RBC AUTO: 88.2 FL (ref 74–97)
MONOCYTES NFR FLD: 0 %
NEUTROPHILS NFR FLD: 1 %
NEUTS BAND # FLD: 0 %
NUC CELL # FLD: 938 /CU MM
PLATELET # BLD AUTO: 335 K/UL (ref 135–420)
PMV BLD AUTO: 9.5 FL (ref 9.2–11.8)
POTASSIUM SERPL-SCNC: 4.2 MMOL/L (ref 3.5–5.5)
PROT FLD-MCNC: 5.2 G/DL
PROTHROMBIN TIME: 13.2 SEC (ref 11.5–15.2)
RBC # BLD AUTO: 4.42 M/UL (ref 4.2–5.3)
RBC # FLD: ABNORMAL /CU MM
SODIUM SERPL-SCNC: 139 MMOL/L (ref 136–145)
SPECIMEN SOURCE FLD: ABNORMAL
SPECIMEN SOURCE FLD: NORMAL
WBC # BLD AUTO: 5.2 K/UL (ref 4.6–13.2)
WBC MORPH BLD: ABNORMAL

## 2020-08-27 PROCEDURE — 38505 NEEDLE BIOPSY LYMPH NODES: CPT

## 2020-08-27 PROCEDURE — 32555 ASPIRATE PLEURA W/ IMAGING: CPT

## 2020-08-27 PROCEDURE — 82945 GLUCOSE OTHER FLUID: CPT

## 2020-08-27 PROCEDURE — 85730 THROMBOPLASTIN TIME PARTIAL: CPT

## 2020-08-27 PROCEDURE — 88305 TISSUE EXAM BY PATHOLOGIST: CPT

## 2020-08-27 PROCEDURE — 85027 COMPLETE CBC AUTOMATED: CPT

## 2020-08-27 PROCEDURE — 88360 TUMOR IMMUNOHISTOCHEM/MANUAL: CPT

## 2020-08-27 PROCEDURE — 88333 PATH CONSLTJ SURG CYTO XM 1: CPT

## 2020-08-27 PROCEDURE — 88374 M/PHMTRC ALYS ISHQUANT/SEMIQ: CPT

## 2020-08-27 PROCEDURE — 89051 BODY FLUID CELL COUNT: CPT

## 2020-08-27 PROCEDURE — 88112 CYTOPATH CELL ENHANCE TECH: CPT

## 2020-08-27 PROCEDURE — 84157 ASSAY OF PROTEIN OTHER: CPT

## 2020-08-27 PROCEDURE — 83615 LACTATE (LD) (LDH) ENZYME: CPT

## 2020-08-27 PROCEDURE — 80048 BASIC METABOLIC PNL TOTAL CA: CPT

## 2020-08-27 PROCEDURE — 71045 X-RAY EXAM CHEST 1 VIEW: CPT

## 2020-08-27 PROCEDURE — 85610 PROTHROMBIN TIME: CPT

## 2020-08-27 PROCEDURE — 87205 SMEAR GRAM STAIN: CPT

## 2020-08-27 RX ORDER — SODIUM CHLORIDE 9 MG/ML
20 INJECTION, SOLUTION INTRAVENOUS CONTINUOUS
Status: DISCONTINUED | OUTPATIENT
Start: 2020-08-27 | End: 2020-08-27 | Stop reason: HOSPADM

## 2020-08-27 NOTE — DISCHARGE INSTRUCTIONS
Patient Education   Thoracentesis: What to Expect at Home  Your Recovery  Thoracentesis (say \"ufbt-sc-tso-JAY-sis\") is a procedure to remove fluid from the space between the lungs and the chest wall (pleural space). This procedure may also be called a \"chest tap. \" It's normal to have a small amount of fluid in the pleural space. But too much fluid can build up because of problems such as infection, heart failure, or lung cancer. The procedure may have been done to help with shortness of breath and pain caused by the fluid buildup. Or you may have had this procedure so the doctor could test the fluid to find the cause of the buildup. Your chest may be sore where the doctor put the needle or catheter into your skin (the puncture site). This usually gets better after a day or two. You can go back to work or your normal activities as soon as you feel up to it. If a large amount of pleural fluid was removed during the procedure, you will probably be able to breathe more easily. If more pleural fluid collects and needs to be removed, another thoracentesis may be done later. If the doctor sent the fluid to a lab for testing, it may take several days to get the results. The doctor or nurse will discuss the results with you. This care sheet gives you a general idea about how long it will take for you to recover. But each person recovers at a different pace. Follow the steps below to feel better as quickly as possible. How can you care for yourself at home? Activity  · Rest when you feel tired. Getting enough sleep will help you recover. · Avoid strenuous activities, such as bicycle riding, jogging, weight lifting, or aerobic exercise, until your doctor says it is okay. · You may shower. Do not take a bath until the puncture site has healed, or until your doctor tells you it is okay. · Ask your doctor when you can drive again. · You may need to take 1 or 2 days off from work.  It depends on the type of work you do and how you feel. Diet  · You can eat your normal diet. · Drink plenty of fluids (unless your doctor tells you not to). Medicines  · Your doctor will tell you if and when you can restart your medicines. He or she will also give you instructions about taking any new medicines. · If you take aspirin or some other blood thinner, ask your doctor if and when to start taking it again. Make sure that you understand exactly what your doctor wants you to do. · Be safe with medicines. Take pain medicines exactly as directed. ? If the doctor gave you a prescription medicine for pain, take it as prescribed. ? If you are not taking a prescription pain medicine, ask your doctor if you can take an over-the-counter medicine. ? Do not take two or more pain medicines at the same time unless the doctor told you to. Many pain medicines have acetaminophen, which is Tylenol. Too much acetaminophen (Tylenol) can be harmful. · If you think your pain medicine is making you sick to your stomach:  ? Take your medicine after meals (unless your doctor has told you not to). ? Ask your doctor for a different pain medicine. · If your doctor prescribed antibiotics, take them as directed. Do not stop taking them just because you feel better. You need to take the full course of antibiotics. Care of the puncture site  · Wash the area daily with warm, soapy water, and pat it dry. Don't use hydrogen peroxide or alcohol, which may delay healing. You may cover the area with a gauze bandage if it weeps or rubs against clothing. Change the bandage every day. · Keep the area clean and dry. Follow-up care is a key part of your treatment and safety. Be sure to make and go to all appointments, and call your doctor if you are having problems. It's also a good idea to know your test results and keep a list of the medicines you take. When should you call for help? [id number] anytime you think you may need emergency care.  For example, call if:  · You passed out (lost consciousness). · You have severe trouble breathing. · You have sudden chest pain and shortness of breath, or you cough up blood. Call your doctor now or seek immediate medical care if:  · You have shortness of breath that is new or getting worse. · You have new or worse pain in your chest, especially when you take a deep breath. · You are sick to your stomach or cannot keep fluids down. · You have a fever over 100°F.  · Bright red blood has soaked through the bandage over your puncture site. · You have signs of infection, such as:  ? Increased pain, swelling, warmth, or redness. ? Red streaks leading from the puncture site. ? Pus draining from the puncture site. ? Swollen lymph nodes in your neck, armpits, or groin. ? A fever. · You cough up a lot more mucus than normal, or your mucus changes color. Watch closely for changes in your health, and be sure to contact your doctor if you have any problems. Where can you learn more? Go to http://gin-denise.info/  Enter Q755 in the search box to learn more about \"Thoracentesis: What to Expect at Home. \"  Current as of: February 24, 2020               Content Version: 12.5  © 6370-9085 [employer]wise, [employer]. Care instructions adapted under license by [employer] (which disclaims liability or warranty for this information). If you have questions about a medical condition or this instruction, always ask your healthcare professional. [place] [address] any warranty or liability for your use of this information. Patient Education   Lymph Node Biopsy: What to Expect at Home  Your Recovery     A lymph node biopsy removes lymph node tissue. The tissue is then looked at under a microscope. It will be studied for signs of disease, such as cancer, or signs of infection. At the site where the tissue was removed, you may have:  · Pain. · Tenderness. · Bleeding. · Bruising.   During the procedure, your doctor may have used a blue dye, a radioactive material, or both. The dye may give your skin a bluish color for several days after the biopsy. And it may turn your urine green for 1 or 2 days. The radioactive material leaves your body quickly through your urine. The amount of radiation used is very small and won't harm you. This care sheet gives you a general idea about how long it will take for you to recover. But each person recovers at a different pace. Follow the steps below to get better as quickly as possible. How can you care for yourself at home? Activity  · Rest when you feel tired. · If you have an incision (cut) from the procedure, avoid activity or exercise that may put stress on that area. Diet  · You can eat your normal diet. If your stomach is upset, try bland, low-fat foods like plain rice, broiled chicken, toast, and yogurt. Medicines  · Your doctor will tell you if and when you can restart your medicines. He or she will also give you instructions about taking any new medicines. · If you take aspirin or some other blood thinner, be sure to talk to your doctor. He or she will tell you if and when to start taking this medicine again. Make sure that you understand exactly what your doctor wants you to do. · Take pain medicines exactly as directed. Incision care  · If you have strips of tape on an incision, leave them on until they fall off. Hygiene  · When you shower, wash the biopsy area with warm water, and then pat it dry. Other instructions  · You might have a mild sore throat if a tube was used to help you breathe during the biopsy. Soothe your sore throat with lozenges, and gargle with warm salt water. · Fluid may collect near the biopsy site. And fluid may leak from the biopsy site. Use an ice pack or take an over-the-counter pain medicine to relieve swelling and mild pain. Follow-up care is a key part of your treatment and safety.  Be sure to make and go to all appointments, and call your doctor if you are having problems. It's also a good idea to know your test results and keep a list of the medicines you take. When should you call for help? Call  911 anytime you think you may need emergency care. For example, call if:  · You passed out (lost consciousness). · You have chest pain, are short of breath, or cough up blood. Call your doctor now or seek immediate medical care if:  · You have pain that does not get better after you take pain medicine. · You are sick to your stomach or can't drink fluids. · Bright red blood has soaked through the bandage over your incision. · You have symptoms of infection, such as:  ? Increased pain, swelling, warmth, or redness. ? Red streaks leading from the area. ? Pus draining from the area. ? A fever. Watch closely for changes in your health, and be sure to contact your doctor if you have any problems. Where can you learn more? Go to http://gin-denise.info/  Enter P500 in the search box to learn more about \"Lymph Node Biopsy: What to Expect at Home. \"  Current as of: August 22, 2019               Content Version: 12.5  © 3107-0329 Healthwise, Incorporated. Care instructions adapted under license by [employer] (which disclaims liability or warranty for this information). If you have questions about a medical condition or this instruction, always ask your healthcare professional. [address] any warranty or liability for your use of this information.

## 2020-08-27 NOTE — H&P
OUTPATIENT HISTORY AND PHYSICAL      Today 8/27/2020     Indication/Symptoms:   Cindy Ramirez is a 67 y.o. female here for image guided right supraclavicular LN core needle Bx and right thoracentesis. Current Meds:    Prior to Admission medications    Medication Sig Start Date End Date Taking? Authorizing Provider   atorvastatin (LIPITOR) 20 mg tablet  11/1/18  Yes Provider, Historical   pantoprazole (PROTONIX) 40 mg tablet  12/11/18  Yes Provider, Historical   NIFEdipine ER (ADALAT CC) 30 mg ER tablet  1/10/19  Yes Provider, Historical   DULoxetine (CYMBALTA) 30 mg capsule Take 30 mg by mouth daily. Yes Other, MD Phil   levoFLOXacin (LEVAQUIN) 750 mg tablet Take 1 Tab by mouth daily. 12/15/19   Moiz Sneed MD   fluconazole (DIFLUCAN) 150 mg tablet  11/1/18   Provider, Historical   metroNIDAZOLE (FLAGYL) 500 mg tablet  11/1/18   Provider, Historical   nabumetone (RELAFEN) 750 mg tablet TAKE 1 TABLET BY MOUTH EVERY 12 HOURS 10/15/18   Provider, Historical   naproxen (NAPROSYN) 375 mg tablet  10/23/18   Provider, Historical   zolpidem (AMBIEN) 10 mg tablet TAKE 1 TABLET BY MOUTH EVERY DAY AS NEEDED FOR INSOMNIA 12/21/18   Provider, Historical   triamcinolone acetonide (KENALOG) 0.1 % topical cream  12/27/18   Provider, Historical   diphenhydrAMINE (BENADRYL) 25 mg capsule Take 25 mg by mouth every six (6) hours as needed. Provider, Historical   aspirin delayed-release 81 mg tablet Take  by mouth daily. Provider, Historical   folic acid (FOLVITE) 1 mg tablet Take  by mouth daily. Other, MD Phil   buPROPion (WELLBUTRIN) 100 mg tablet Take 100 mg by mouth. Phil Calvo MD   methotrexate (RHEUMATREX) 2.5 mg tablet Take 2.5 mg by mouth. Phil Calvo MD       Allergies:       Allergies   Allergen Reactions    Penicillins Unknown (comments) and Itching    Sulfa (Sulfonamide Antibiotics) Unknown (comments) and Itching       Comorbid Conditions:    Past Medical History: Diagnosis Date    Arthritis     Cancer ([facility] [address]     breast    Depression     GERD (gastroesophageal reflux disease)     Low back pain     Osteopenia           Past Surgical History:   Procedure Laterality Date    BREAST SURGERY PROCEDURE UNLISTED  lumpectomy     Data:    Visit Vitals  /81 (BP 1 Location: Left arm, BP Patient Position: At rest)   Pulse 88   Temp 98.2 °F (36.8 °C)   Resp 20   Ht 5' 6\" (1.676 m)   Wt 83.9 kg (185 lb)   SpO2 97%   BMI 29.86 kg/m²   :  Recent Labs     08/27/20  1030        Recent Labs     08/27/20  1030   INR 1.0   APTT 26.0       The H & P and/or progress notes and any available imaging were reviewed. The risks, indications and possible alternatives to the procedure, including doing nothing, were discussed and informed consent was obtained. Physical Exam:      Mental status:   Alert and oriented. Examination specific to the procedure proposed to be performed and any co morbid conditions:   Mallampati classification 2 ,  ASA2   Heart:   RRR. Lungs:  Decreased right breaths soundsi. The patient is an appropriate candidate to undergo the planned procedure and sedation.     Roann Hamman, MD

## 2020-08-27 NOTE — PROCEDURES
RADIOLOGY POST PROCEDURE NOTE     August 27, 2020       1:11 PM     Preoperative Diagnosis:   Metastatic breast cancer    Postoperative Diagnosis:  Same. :  Dr. Juliet Montero    Assistant:  None. Type of Anesthesia: 1% plain lidocaine    Procedure/Description:  Image guided right supraclavicular LN core needle Bx. Image guided right thoracentesis. Findings:   No bleeding. Estimated blood Loss:  Minimal    Specimen Removed:   yes    Blood transfusions:  None. Implants:  None.     Complications: None    Condition: Stable    Discharge Plan:  discharge home     Tamra Deutsch MD

## 2020-08-31 LAB
BACTERIA SPEC CULT: NORMAL
BACTERIA SPEC CULT: NORMAL
GRAM STN SPEC: NORMAL
GRAM STN SPEC: NORMAL
SERVICE CMNT-IMP: NORMAL

## 2020-10-13 ENCOUNTER — HOSPITAL ENCOUNTER (OUTPATIENT)
Age: 75
Discharge: HOME | End: 2020-10-13
Attending: INTERNAL MEDICINE
Payer: MEDICARE

## 2020-10-13 DIAGNOSIS — C50.919 BREAST CANCER (HCC): ICD-10-CM

## 2020-10-13 LAB — CREAT UR-MCNC: 0.9 MG/DL (ref 0.6–1.3)

## 2020-10-13 PROCEDURE — C8908 MRI W/O FOL W/CONT, BREAST,: HCPCS

## 2020-10-13 PROCEDURE — A9577 INJ MULTIHANCE: HCPCS

## 2020-10-13 PROCEDURE — 82565 ASSAY OF CREATININE: CPT

## 2020-10-13 PROCEDURE — 74011250636 HC RX REV CODE- 250/636

## 2020-10-13 PROCEDURE — 77049 MRI BREAST C-+ W/CAD BI: CPT

## 2020-10-13 RX ADMIN — GADOBENATE DIMEGLUMINE 17 ML: 529 INJECTION, SOLUTION INTRAVENOUS at 11:00

## 2020-11-30 ENCOUNTER — TRANSCRIBE ORDER (OUTPATIENT)
Dept: SCHEDULING | Age: 75
End: 2020-11-30

## 2020-11-30 DIAGNOSIS — C50.919: Primary | ICD-10-CM

## 2020-11-30 DIAGNOSIS — C79.51: Primary | ICD-10-CM

## 2020-12-15 ENCOUNTER — HOSPITAL ENCOUNTER (OUTPATIENT)
Dept: CT IMAGING | Age: 75
Discharge: HOME | End: 2020-12-15
Attending: PHYSICIAN ASSISTANT
Payer: MEDICARE

## 2020-12-15 DIAGNOSIS — C79.51: ICD-10-CM

## 2020-12-15 DIAGNOSIS — C50.919: ICD-10-CM

## 2020-12-15 LAB — CREAT UR-MCNC: 0.8 MG/DL (ref 0.6–1.3)

## 2020-12-15 PROCEDURE — 74177 CT ABD & PELVIS W/CONTRAST: CPT

## 2020-12-15 PROCEDURE — 74011000636 HC RX REV CODE- 636

## 2020-12-15 PROCEDURE — 82565 ASSAY OF CREATININE: CPT

## 2020-12-15 RX ADMIN — IOPAMIDOL 100 ML: 612 INJECTION, SOLUTION INTRAVENOUS at 11:46

## 2022-08-12 ENCOUNTER — TRANSCRIBE ORDER (OUTPATIENT)
Dept: SCHEDULING | Age: 77
End: 2022-08-12

## 2022-08-12 DIAGNOSIS — R06.09 OTHER FORMS OF DYSPNEA: ICD-10-CM

## 2022-08-12 DIAGNOSIS — J90 PLEURAL EFFUSION: ICD-10-CM

## 2022-08-12 DIAGNOSIS — C50.919 MALIGNANT NEOPLASM OF BREAST (FEMALE) (HCC): Primary | ICD-10-CM

## 2022-10-19 ENCOUNTER — HOSPITAL ENCOUNTER (EMERGENCY)
Age: 77
Discharge: ANOTHER HOSPITAL | End: 2022-10-20
Attending: EMERGENCY MEDICINE
Payer: MEDICARE

## 2022-10-19 ENCOUNTER — APPOINTMENT (OUTPATIENT)
Dept: CT IMAGING | Age: 77
End: 2022-10-19
Attending: EMERGENCY MEDICINE
Payer: MEDICARE

## 2022-10-19 ENCOUNTER — APPOINTMENT (OUTPATIENT)
Dept: GENERAL RADIOLOGY | Age: 77
End: 2022-10-19
Attending: PHYSICIAN ASSISTANT
Payer: MEDICARE

## 2022-10-19 VITALS
RESPIRATION RATE: 20 BRPM | HEART RATE: 106 BPM | OXYGEN SATURATION: 97 % | WEIGHT: 184.97 LBS | SYSTOLIC BLOOD PRESSURE: 116 MMHG | BODY MASS INDEX: 29.73 KG/M2 | DIASTOLIC BLOOD PRESSURE: 73 MMHG | HEIGHT: 66 IN | TEMPERATURE: 98.1 F

## 2022-10-19 DIAGNOSIS — I26.02 ACUTE SADDLE PULMONARY EMBOLISM WITH ACUTE COR PULMONALE (HCC): Primary | ICD-10-CM

## 2022-10-19 LAB
ALBUMIN SERPL-MCNC: 3.4 G/DL (ref 3.4–5)
ALBUMIN/GLOB SERPL: 0.6 {RATIO} (ref 0.8–1.7)
ALP SERPL-CCNC: 86 U/L (ref 45–117)
ALT SERPL-CCNC: 31 U/L (ref 13–56)
ANION GAP SERPL CALC-SCNC: 7 MMOL/L (ref 3–18)
APTT PPP: 21.5 SEC (ref 23–36.4)
ARTERIAL PATENCY WRIST A: POSITIVE
AST SERPL-CCNC: 25 U/L (ref 10–38)
BASE EXCESS BLD CALC-SCNC: 1.1 MMOL/L
BASE EXCESS BLD CALC-SCNC: 2.1 MMOL/L
BASOPHILS # BLD: 0 K/UL (ref 0–0.1)
BASOPHILS NFR BLD: 1 % (ref 0–2)
BDY SITE: ABNORMAL
BILIRUB SERPL-MCNC: 0.3 MG/DL (ref 0.2–1)
BNP SERPL-MCNC: 1131 PG/ML (ref 0–1800)
BUN SERPL-MCNC: 25 MG/DL (ref 7–18)
BUN/CREAT SERPL: 19 (ref 12–20)
CALCIUM SERPL-MCNC: 9.2 MG/DL (ref 8.5–10.1)
CHLORIDE SERPL-SCNC: 97 MMOL/L (ref 100–111)
CO2 SERPL-SCNC: 30 MMOL/L (ref 21–32)
CREAT SERPL-MCNC: 1.33 MG/DL (ref 0.6–1.3)
D DIMER PPP FEU-MCNC: 19.85 UG/ML(FEU)
DIFFERENTIAL METHOD BLD: ABNORMAL
EOSINOPHIL # BLD: 0 K/UL (ref 0–0.4)
EOSINOPHIL NFR BLD: 1 % (ref 0–5)
ERYTHROCYTE [DISTWIDTH] IN BLOOD BY AUTOMATED COUNT: 14.6 % (ref 11.6–14.5)
FLUAV RNA SPEC QL NAA+PROBE: NOT DETECTED
FLUBV RNA SPEC QL NAA+PROBE: NOT DETECTED
GAS FLOW.O2 O2 DELIVERY SYS: ABNORMAL L/MIN
GLOBULIN SER CALC-MCNC: 5.3 G/DL (ref 2–4)
GLUCOSE SERPL-MCNC: 199 MG/DL (ref 74–99)
HCO3 BLD-SCNC: 25.2 MMOL/L (ref 22–26)
HCO3 BLD-SCNC: 29 MMOL/L (ref 22–26)
HCT VFR BLD AUTO: 38.5 % (ref 35–45)
HGB BLD-MCNC: 12.9 G/DL (ref 12–16)
IMM GRANULOCYTES # BLD AUTO: 0 K/UL
IMM GRANULOCYTES NFR BLD AUTO: 0 %
LACTATE BLD-SCNC: 0.95 MMOL/L (ref 0.4–2)
LACTATE BLD-SCNC: 2.41 MMOL/L (ref 0.4–2)
LYMPHOCYTES # BLD: 1.4 K/UL (ref 0.9–3.6)
LYMPHOCYTES NFR BLD: 43 % (ref 21–52)
MCH RBC QN AUTO: 35.3 PG (ref 24–34)
MCHC RBC AUTO-ENTMCNC: 33.5 G/DL (ref 31–37)
MCV RBC AUTO: 105.5 FL (ref 78–100)
MONOCYTES # BLD: 0.1 K/UL (ref 0.05–1.2)
MONOCYTES NFR BLD: 3 % (ref 3–10)
NEUTS SEG # BLD: 1.8 K/UL (ref 1.8–8)
NEUTS SEG NFR BLD: 52 % (ref 40–73)
NRBC # BLD: 0 K/UL (ref 0–0.01)
NRBC BLD-RTO: 0 PER 100 WBC
O2/TOTAL GAS SETTING VFR VENT: 100 %
PCO2 BLD: 37.4 MMHG (ref 35–45)
PCO2 BLD: 52.6 MMHG (ref 35–45)
PH BLD: 7.35 [PH] (ref 7.35–7.45)
PH BLD: 7.44 [PH] (ref 7.35–7.45)
PLATELET # BLD AUTO: 156 K/UL (ref 135–420)
PLATELET COMMENTS,PCOM: ABNORMAL
PMV BLD AUTO: 9.5 FL (ref 9.2–11.8)
PO2 BLD: 25 MMHG (ref 80–100)
PO2 BLD: 74 MMHG (ref 80–100)
POTASSIUM SERPL-SCNC: 4.3 MMOL/L (ref 3.5–5.5)
PROT SERPL-MCNC: 8.7 G/DL (ref 6.4–8.2)
RBC # BLD AUTO: 3.65 M/UL (ref 4.2–5.3)
RBC MORPH BLD: ABNORMAL
SAO2 % BLD: 40.5 % (ref 92–97)
SAO2 % BLD: 95.2 % (ref 92–97)
SARS-COV-2, COV2: NOT DETECTED
SERVICE CMNT-IMP: ABNORMAL
SERVICE CMNT-IMP: ABNORMAL
SODIUM SERPL-SCNC: 134 MMOL/L (ref 136–145)
SPECIMEN TYPE: ABNORMAL
SPECIMEN TYPE: ABNORMAL
TROPONIN-HIGH SENSITIVITY: 42 NG/L (ref 0–54)
WBC # BLD AUTO: 3.3 K/UL (ref 4.6–13.2)

## 2022-10-19 PROCEDURE — 74011250636 HC RX REV CODE- 250/636: Performed by: EMERGENCY MEDICINE

## 2022-10-19 PROCEDURE — 82803 BLOOD GASES ANY COMBINATION: CPT

## 2022-10-19 PROCEDURE — 80053 COMPREHEN METABOLIC PANEL: CPT

## 2022-10-19 PROCEDURE — 87636 SARSCOV2 & INF A&B AMP PRB: CPT

## 2022-10-19 PROCEDURE — 74011000636 HC RX REV CODE- 636: Performed by: EMERGENCY MEDICINE

## 2022-10-19 PROCEDURE — 84484 ASSAY OF TROPONIN QUANT: CPT

## 2022-10-19 PROCEDURE — 83605 ASSAY OF LACTIC ACID: CPT

## 2022-10-19 PROCEDURE — 85730 THROMBOPLASTIN TIME PARTIAL: CPT

## 2022-10-19 PROCEDURE — 85025 COMPLETE CBC W/AUTO DIFF WBC: CPT

## 2022-10-19 PROCEDURE — 36600 WITHDRAWAL OF ARTERIAL BLOOD: CPT

## 2022-10-19 PROCEDURE — 93005 ELECTROCARDIOGRAM TRACING: CPT

## 2022-10-19 PROCEDURE — 94762 N-INVAS EAR/PLS OXIMTRY CONT: CPT

## 2022-10-19 PROCEDURE — 71275 CT ANGIOGRAPHY CHEST: CPT

## 2022-10-19 PROCEDURE — 77010033711 HC HIGH FLOW OXYGEN

## 2022-10-19 PROCEDURE — 94660 CPAP INITIATION&MGMT: CPT

## 2022-10-19 PROCEDURE — 85379 FIBRIN DEGRADATION QUANT: CPT

## 2022-10-19 PROCEDURE — 83880 ASSAY OF NATRIURETIC PEPTIDE: CPT

## 2022-10-19 PROCEDURE — 99285 EMERGENCY DEPT VISIT HI MDM: CPT

## 2022-10-19 PROCEDURE — 96374 THER/PROPH/DIAG INJ IV PUSH: CPT

## 2022-10-19 PROCEDURE — 71045 X-RAY EXAM CHEST 1 VIEW: CPT

## 2022-10-19 RX ORDER — HEPARIN SODIUM 1000 [USP'U]/ML
80 INJECTION, SOLUTION INTRAVENOUS; SUBCUTANEOUS ONCE
Status: COMPLETED | OUTPATIENT
Start: 2022-10-19 | End: 2022-10-19

## 2022-10-19 RX ORDER — HEPARIN SODIUM 10000 [USP'U]/100ML
18-36 INJECTION, SOLUTION INTRAVENOUS
Status: DISCONTINUED | OUTPATIENT
Start: 2022-10-19 | End: 2022-10-20 | Stop reason: HOSPADM

## 2022-10-19 RX ADMIN — SODIUM CHLORIDE 1000 ML: 9 INJECTION, SOLUTION INTRAVENOUS at 20:00

## 2022-10-19 RX ADMIN — HEPARIN SODIUM 6710 UNITS: 1000 INJECTION INTRAVENOUS; SUBCUTANEOUS at 21:22

## 2022-10-19 RX ADMIN — IOPAMIDOL 72 ML: 755 INJECTION, SOLUTION INTRAVENOUS at 19:23

## 2022-10-19 NOTE — ED TRIAGE NOTES
EMS called to home for SOB, states patient sitting on commode onset SOB, tripoding, acute distress, denies known respiratory history. Patient is following cardiac and currently has cardiac monitor in place.  Left upper chest

## 2022-10-19 NOTE — ED PROVIDER NOTES
The patient is a 65-year-old woman who is currently undergoing treatment for breast cancer with an oral chemotherapeutic, who also has a history of arthritis, depression, GERD, and low back pain, who presents to the ED today with shortness of breath. The patient states that she was visiting her  who is admitted at [facility].  She was driving home and then got very hot and then flushed and then felt like she could not breathe. She made at home but then had to have her neighbor help her out of the car. When she did get out, she could barely walk. Her neighbor had to help get her to the door and inside of the house. When she was inside, she went into the bathroom and had profuse diarrhea all at once. She has not really able to completely describe what the diarrhea looks like. At that point, she called her daughter, but then her daughter was not able to get to her soon enough, then she called 911. When the ambulance arrived she was sitting on the toilet. Her oxygen saturation was 70% on room air. She arrived on a nonrebreather, and our nurse also gave her a trial off of oxygen and she desatted to the 76s. At this time, she is on 30 L/min at 100% high flow oxygen. She denies any prior history of respiratory diseases. She does not believe that she has been exposed to [employer]. She is wearing a heart monitor but she left part of it at home. Past Medical History:   Diagnosis Date    Arthritis     Cancer ([address]     breast    Depression     GERD (gastroesophageal reflux disease)     Low back pain     Osteopenia        Past Surgical History:   Procedure Laterality Date    BREAST SURGERY PROCEDURE UNLISTED  lumpectomy         No family history on file.     Social History     Socioeconomic History    Marital status:      Spouse name: Not on file    Number of children: Not on file    Years of education: Not on file    Highest education level: Not on file Occupational History    Not on file   Tobacco Use    Smoking status: Never    Smokeless tobacco: Never   Substance and Sexual Activity    Alcohol use: Yes     Alcohol/week: 0.0 standard drinks    Drug use: Not on file    Sexual activity: Not on file   Other Topics Concern    Not on file   Social History Narrative    Not on file     Social Determinants of Health     Financial Resource Strain: Not on file   Food Insecurity: Not on file   Transportation Needs: Not on file   Physical Activity: Not on file   Stress: Not on file   Social Connections: Not on file   Intimate Partner Violence: Not on file   Housing Stability: Not on file         ALLERGIES: Penicillins and Sulfa (sulfonamide antibiotics)    Review of Systems   All other systems reviewed and are negative. Vitals:    10/19/22 1722 10/19/22 1732 10/19/22 1733 10/19/22 1800   BP:  103/64  121/74   Pulse: (!) 110   (!) 113   Resp: 25   19   Temp:  97.5 °F (36.4 °C)     SpO2: 100%  100% 95%            Physical Exam  Vitals and nursing note reviewed. Constitutional:       Appearance: She is obese. HENT:      Head: Normocephalic and atraumatic. Mouth/Throat:      Mouth: Mucous membranes are moist.      Pharynx: Oropharynx is clear. Eyes:      Extraocular Movements: Extraocular movements intact. Pupils: Pupils are equal, round, and reactive to light. Cardiovascular:      Rate and Rhythm: Regular rhythm. Tachycardia present. Pulses: Normal pulses. Heart sounds: Normal heart sounds. Pulmonary:      Effort: Tachypnea present. Breath sounds: Normal breath sounds. Abdominal:      General: Bowel sounds are normal.      Palpations: Abdomen is soft. Musculoskeletal:         General: Normal range of motion. Cervical back: Normal range of motion and neck supple. Skin:     General: Skin is warm and dry. Capillary Refill: Capillary refill takes less than 2 seconds. Neurological:      General: No focal deficit present. Mental Status: She is alert and oriented to person, place, and time.    Psychiatric:         Mood and Affect: Mood normal.         Behavior: Behavior normal.      Recent Results (from the past 12 hour(s))   EKG, 12 LEAD, INITIAL    Collection Time: 10/19/22  5:06 PM   Result Value Ref Range    Ventricular Rate 112 BPM    Atrial Rate 112 BPM    P-R Interval 122 ms    QRS Duration 72 ms    Q-T Interval 346 ms    QTC Calculation (Bezet) 472 ms    Calculated P Axis 41 degrees    Calculated R Axis -33 degrees    Calculated T Axis 5 degrees    Diagnosis       Sinus tachycardia  Left axis deviation  Minimal voltage criteria for LVH, may be normal variant ( R in aVL )  Nonspecific ST and T wave abnormality  Abnormal ECG  When compared with ECG of 27-OCT-2014 20:37,  premature ventricular complexes are no longer present  ST now depressed in Anterior leads  T wave inversion now evident in Anterior leads     BLOOD GAS,LACTIC ACID, POC    Collection Time: 10/19/22  5:29 PM   Result Value Ref Range    pH (POC) 7.35 7.35 - 7.45      pCO2 (POC) 52.6 (H) 35.0 - 45.0 MMHG    pO2 (POC) 25 (LL) 80 - 100 MMHG    HCO3 (POC) 29.0 (H) 22 - 26 MMOL/L    sO2 (POC) 40.5 (L) 92 - 97 %    Base excess (POC) 2.1 mmol/L    Specimen type (POC) VENOUS BLOOD      Performed by [facility]     Lactic Acid (POC) 2.41 (HH) 0.40 - 2.00 mmol/L   CBC WITH AUTOMATED DIFF    Collection Time: 10/19/22  5:30 PM   Result Value Ref Range    WBC 3.3 (L) 4.6 - 13.2 K/uL    RBC 3.65 (L) 4.20 - 5.30 M/uL    HGB 12.9 12.0 - 16.0 g/dL    HCT 38.5 35.0 - 45.0 %    .5 (H) 78.0 - 100.0 FL    MCH 35.3 (H) 24.0 - 34.0 PG    MCHC 33.5 31.0 - 37.0 g/dL    RDW 14.6 (H) 11.6 - 14.5 %    PLATELET 996 132 - 843 K/uL    MPV 9.5 9.2 - 11.8 FL    NRBC 0.0 0  WBC    ABSOLUTE NRBC 0.00 0.00 - 0.01 K/uL    NEUTROPHILS 52 40 - 73 %    LYMPHOCYTES 43 21 - 52 %    MONOCYTES 3 3 - 10 %    EOSINOPHILS 1 0 - 5 %    BASOPHILS 1 0 - 2 %    IMMATURE GRANULOCYTES 0 % ABS. NEUTROPHILS 1.8 1.8 - 8.0 K/UL    ABS. LYMPHOCYTES 1.4 0.9 - 3.6 K/UL    ABS. MONOCYTES 0.1 0.05 - 1.2 K/UL    ABS. EOSINOPHILS 0.0 0.0 - 0.4 K/UL    ABS. BASOPHILS 0.0 0.0 - 0.1 K/UL    ABS. IMM. GRANS. 0.0 K/UL    DF MANUAL      PLATELET COMMENTS ADEQUATE PLATELETS      RBC COMMENTS NORMOCYTIC, NORMOCHROMIC     METABOLIC PANEL, COMPREHENSIVE    Collection Time: 10/19/22  5:30 PM   Result Value Ref Range    Sodium 134 (L) 136 - 145 mmol/L    Potassium 4.3 3.5 - 5.5 mmol/L    Chloride 97 (L) 100 - 111 mmol/L    CO2 30 21 - 32 mmol/L    Anion gap 7 3.0 - 18 mmol/L    Glucose 199 (H) 74 - 99 mg/dL    BUN 25 (H) 7.0 - 18 MG/DL    Creatinine 1.33 (H) 0.6 - 1.3 MG/DL    BUN/Creatinine ratio 19 12 - 20      eGFR 41 (L) >60 ml/min/1.73m2    Calcium 9.2 8.5 - 10.1 MG/DL    Bilirubin, total 0.3 0.2 - 1.0 MG/DL    ALT (SGPT) 31 13 - 56 U/L    AST (SGOT) 25 10 - 38 U/L    Alk.  phosphatase 86 45 - 117 U/L    Protein, total 8.7 (H) 6.4 - 8.2 g/dL    Albumin 3.4 3.4 - 5.0 g/dL    Globulin 5.3 (H) 2.0 - 4.0 g/dL    A-G Ratio 0.6 (L) 0.8 - 1.7     TROPONIN-HIGH SENSITIVITY    Collection Time: 10/19/22  5:30 PM   Result Value Ref Range    Troponin-High Sensitivity 42 0 - 54 ng/L   NT-PRO BNP    Collection Time: 10/19/22  5:30 PM   Result Value Ref Range    NT pro-BNP 1,131 0 - 1,800 PG/ML   D DIMER    Collection Time: 10/19/22  5:30 PM   Result Value Ref Range    D DIMER 19.85 (H) <0.46 ug/ml(FEU)   BLOOD GAS, ARTERIAL POC    Collection Time: 10/19/22  6:25 PM   Result Value Ref Range    Device: High Flow Nasal Cannula      FIO2 (POC) 100 %    pH (POC) 7.44 7.35 - 7.45      pCO2 (POC) 37.4 35.0 - 45.0 MMHG    pO2 (POC) 74 (L) 80 - 100 MMHG    HCO3 (POC) 25.2 22 - 26 MMOL/L    sO2 (POC) 95.2 92 - 97 %    Base excess (POC) 1.1 mmol/L    Allens test (POC) Positive      Site LEFT RADIAL      Specimen type (POC) ARTERIAL      Performed by [facility] PAULID-19 WITH INFLUENZA A/B    Collection Time: 10/19/22  6:50 PM   Result Value Ref Range    SARS-CoV-2 by PCR Not detected NOTD      Influenza A by PCR Not detected NOTD      Influenza B by PCR Not detected NOTD     POC LACTIC ACID    Collection Time: 10/19/22  8:04 PM   Result Value Ref Range    Lactic Acid (POC) 0.95 0.40 - 2.00 mmol/L     CTA CHEST W OR W WO CONT   Final Result   1. Positive for pulmonary emboli including at the bilateral main pulmonary   arteries with sagittal embolus on the left. There are 4/4 findings positive for   heart strain. No infarct. 2.  Small right pleural effusion with mildly improved rounded atelectasis. 3.  Mild bronchiolitis left upper lobe. 4.  Cholelithiasis. 5.  Small sliding hiatal hernia. Results discussed with Dr. Lyn Ornelas on 10/19/2022 at 8:06 PM.          XR CHEST PORT    (Results Pending)         MDM  Number of Diagnoses or Management Options  Acute saddle pulmonary embolism with acute cor pulmonale ([facility] [address]  Diagnosis management comments: The patient is a 74-year-old woman who is currently undergoing oral chemotherapy for breast cancer, who presents to the ED today with shortness of breath that came on suddenly while she was driving home from visiting her  at [facility].  She also had profuse diarrhea when she did get home. At that point she called the ambulance. Her oxygen saturation has been in the 70s on room air. She is now on high flow oxygen. The patient's D-dimer is 19.85. Her ABG showed hypoxia but not hypercapnia. We switched her from nonrebreather to high flow oxygen. Her CT shows a number of pulmonary emboli including the bilateral main pulmonary arteries with a very large PE on the left. She also has heart strain. I have consulted our vascular surgeon Dr. Juliette Marie, who reviewed the chart and the images and believes that the patient needs to be transferred to [facility] for catheter directed therapy given her clot burden and her hypoxia.      I spoke with Dr. Camille Patterson from vascular surgery who has accepted the patient to his service. He stated that the patient either needs to go to the ICU or the ED. There are no ICU beds available at [facility] right now. He asked if I could contact the ED to see if we could do an ED to ED transfer. Dr. Galina Saavedra from the ED has accepted the patient in transfer.              Critical Care  Performed by: Arnoldo Carrillo MD  Authorized by: Arnoldo Carrillo MD     Critical care provider statement:     Critical care time (minutes):  60    Critical care time was exclusive of:  Separately billable procedures and treating other patients    Critical care was necessary to treat or prevent imminent or life-threatening deterioration of the following conditions:  Cardiac failure, circulatory failure and respiratory failure    Critical care was time spent personally by me on the following activities:  Development of treatment plan with patient or surrogate, discussions with consultants, evaluation of patient's response to treatment, examination of patient, obtaining history from patient or surrogate, ordering and performing treatments and interventions, ordering and review of laboratory studies, ordering and review of radiographic studies, pulse oximetry, re-evaluation of patient's condition and review of old charts    I assumed direction of critical care for this patient from another provider in my specialty: no      Care discussed with: accepting provider at another facility

## 2022-10-20 LAB
ATRIAL RATE: 112 BPM
CALCULATED P AXIS, ECG09: 41 DEGREES
CALCULATED R AXIS, ECG10: -33 DEGREES
CALCULATED T AXIS, ECG11: 5 DEGREES
DIAGNOSIS, 93000: NORMAL
P-R INTERVAL, ECG05: 122 MS
Q-T INTERVAL, ECG07: 346 MS
QRS DURATION, ECG06: 72 MS
QTC CALCULATION (BEZET), ECG08: 472 MS
VENTRICULAR RATE, ECG03: 112 BPM

## 2022-10-20 NOTE — PROGRESS NOTES
Patient placed on non-rebreather and monitored to ensure she would maintain adequate O2 sats in order to transport to [facility].      Patient tolerating NRB and is currently being picked up for transport to [facility]

## 2022-10-20 NOTE — ED NOTES
Pt lying in bed. A&o with appropr response. Able to speak in complete sentences. On HFNC 20 l/min at 100% FiO2. Spo2 90%  better. No WOB at rest but gets winded with moving in bed. Skin NWD.  +distal pulses.

## 2022-10-20 NOTE — ED NOTES
Report called to Jamie Coronel RN at [facility] ED at 2250. Accepting is Ceci Carroll MD there. Pt. Out to there via [facility] with heparin going at 18 units/kg/hour.     Pt out with [facility] at 2300 10/19/22

## 2023-02-03 ENCOUNTER — TRANSCRIBE ORDER (OUTPATIENT)
Dept: SCHEDULING | Age: 78
End: 2023-02-03

## 2023-02-03 DIAGNOSIS — I26.99 PULMONARY EMBOLISM (HCC): Primary | ICD-10-CM

## 2023-02-15 ENCOUNTER — APPOINTMENT (OUTPATIENT)
Facility: HOSPITAL | Age: 78
End: 2023-02-15
Payer: MEDICARE

## 2023-02-15 ENCOUNTER — HOSPITAL ENCOUNTER (EMERGENCY)
Facility: HOSPITAL | Age: 78
Discharge: HOME | End: 2023-02-15
Attending: EMERGENCY MEDICINE
Payer: MEDICARE

## 2023-02-15 VITALS
OXYGEN SATURATION: 95 % | DIASTOLIC BLOOD PRESSURE: 55 MMHG | RESPIRATION RATE: 28 BRPM | BODY MASS INDEX: 29.57 KG/M2 | WEIGHT: 184 LBS | HEIGHT: 66 IN | HEART RATE: 96 BPM | TEMPERATURE: 98.6 F | SYSTOLIC BLOOD PRESSURE: 128 MMHG

## 2023-02-15 DIAGNOSIS — J18.9 PNEUMONIA DUE TO INFECTIOUS ORGANISM, UNSPECIFIED LATERALITY, UNSPECIFIED PART OF LUNG: Primary | ICD-10-CM

## 2023-02-15 LAB
ALBUMIN SERPL-MCNC: 2.8 G/DL (ref 3.4–5)
ALBUMIN/GLOB SERPL: 0.7 (ref 0.8–1.7)
ALP SERPL-CCNC: 76 U/L (ref 45–117)
ALT SERPL-CCNC: 19 U/L (ref 13–56)
ANION GAP SERPL CALC-SCNC: 7 MMOL/L (ref 3–18)
AST SERPL-CCNC: 15 U/L (ref 10–38)
BASOPHILS # BLD: 0.1 K/UL (ref 0–0.1)
BASOPHILS NFR BLD: 4 % (ref 0–2)
BILIRUB SERPL-MCNC: 0.3 MG/DL (ref 0.2–1)
BUN SERPL-MCNC: 11 MG/DL (ref 7–18)
BUN/CREAT SERPL: 13 (ref 12–20)
CALCIUM SERPL-MCNC: 8.4 MG/DL (ref 8.5–10.1)
CHLORIDE SERPL-SCNC: 104 MMOL/L (ref 100–111)
CO2 SERPL-SCNC: 27 MMOL/L (ref 21–32)
CREAT SERPL-MCNC: 0.83 MG/DL (ref 0.6–1.3)
DIFFERENTIAL METHOD BLD: ABNORMAL
EKG ATRIAL RATE: 121 BPM
EKG DIAGNOSIS: NORMAL
EKG P AXIS: 10 DEGREES
EKG P-R INTERVAL: 126 MS
EKG Q-T INTERVAL: 328 MS
EKG QRS DURATION: 78 MS
EKG QTC CALCULATION (BAZETT): 465 MS
EKG R AXIS: -27 DEGREES
EKG T AXIS: -3 DEGREES
EKG VENTRICULAR RATE: 121 BPM
EOSINOPHIL # BLD: 0 K/UL (ref 0–0.4)
EOSINOPHIL NFR BLD: 0 % (ref 0–5)
ERYTHROCYTE [DISTWIDTH] IN BLOOD BY AUTOMATED COUNT: 19 % (ref 11.6–14.5)
FLUAV RNA SPEC QL NAA+PROBE: NOT DETECTED
FLUBV RNA SPEC QL NAA+PROBE: NOT DETECTED
GLOBULIN SER CALC-MCNC: 3.9 G/DL (ref 2–4)
GLUCOSE SERPL-MCNC: 196 MG/DL (ref 74–99)
HCT VFR BLD AUTO: 29.7 % (ref 35–45)
HGB BLD-MCNC: 9.9 G/DL (ref 12–16)
IMM GRANULOCYTES # BLD AUTO: 0 K/UL (ref 0–0.04)
IMM GRANULOCYTES NFR BLD AUTO: 0 % (ref 0–0.5)
LACTATE SERPL-SCNC: 1 MMOL/L (ref 0.4–2)
LYMPHOCYTES # BLD: 0.3 K/UL (ref 0.9–3.6)
LYMPHOCYTES NFR BLD: 20 % (ref 21–52)
MCH RBC QN AUTO: 32.9 PG (ref 24–34)
MCHC RBC AUTO-ENTMCNC: 33.3 G/DL (ref 31–37)
MCV RBC AUTO: 98.7 FL (ref 78–100)
MONOCYTES # BLD: 0.1 K/UL (ref 0.05–1.2)
MONOCYTES NFR BLD: 4 % (ref 3–10)
NEUTS BAND NFR BLD MANUAL: 8 %
NEUTS SEG # BLD: 1.2 K/UL (ref 1.8–8)
NEUTS SEG NFR BLD: 64 % (ref 40–73)
NRBC # BLD: 0.02 K/UL (ref 0–0.01)
NRBC BLD-RTO: 1.2 PER 100 WBC
NT PRO BNP: 1260 PG/ML (ref 0–1800)
PLATELET # BLD AUTO: 194 K/UL (ref 135–420)
PLATELET COMMENT: ABNORMAL
PMV BLD AUTO: 9.2 FL (ref 9.2–11.8)
POTASSIUM SERPL-SCNC: 4.2 MMOL/L (ref 3.5–5.5)
PROT SERPL-MCNC: 6.7 G/DL (ref 6.4–8.2)
RBC # BLD AUTO: 3.01 M/UL (ref 4.2–5.3)
RBC MORPH BLD: ABNORMAL
SARS-COV-2 RNA RESP QL NAA+PROBE: NOT DETECTED
SODIUM SERPL-SCNC: 138 MMOL/L (ref 136–145)
TOTAL CELLS COUNTED SPEC: 25
TROPONIN I SERPL HS-MCNC: 12 NG/L (ref 0–54)
WBC # BLD AUTO: 1.7 K/UL (ref 4.6–13.2)

## 2023-02-15 PROCEDURE — 96374 THER/PROPH/DIAG INJ IV PUSH: CPT

## 2023-02-15 PROCEDURE — 6360000002 HC RX W HCPCS: Performed by: EMERGENCY MEDICINE

## 2023-02-15 PROCEDURE — 87636 SARSCOV2 & INF A&B AMP PRB: CPT

## 2023-02-15 PROCEDURE — 6360000004 HC RX CONTRAST MEDICATION: Performed by: EMERGENCY MEDICINE

## 2023-02-15 PROCEDURE — 83880 ASSAY OF NATRIURETIC PEPTIDE: CPT

## 2023-02-15 PROCEDURE — 84484 ASSAY OF TROPONIN QUANT: CPT

## 2023-02-15 PROCEDURE — 99285 EMERGENCY DEPT VISIT HI MDM: CPT

## 2023-02-15 PROCEDURE — 83605 ASSAY OF LACTIC ACID: CPT

## 2023-02-15 PROCEDURE — 71275 CT ANGIOGRAPHY CHEST: CPT

## 2023-02-15 PROCEDURE — 85025 COMPLETE CBC W/AUTO DIFF WBC: CPT

## 2023-02-15 PROCEDURE — 2580000003 HC RX 258: Performed by: EMERGENCY MEDICINE

## 2023-02-15 PROCEDURE — 80053 COMPREHEN METABOLIC PANEL: CPT

## 2023-02-15 RX ORDER — AZITHROMYCIN 250 MG/1
250 TABLET, FILM COATED ORAL SEE ADMIN INSTRUCTIONS
Qty: 6 TABLET | Refills: 0 | Status: SHIPPED | OUTPATIENT
Start: 2023-02-15 | End: 2023-02-20

## 2023-02-15 RX ORDER — ACETAMINOPHEN 325 MG/1
650 TABLET ORAL EVERY 4 HOURS PRN
Status: DISCONTINUED | OUTPATIENT
Start: 2023-02-15 | End: 2023-02-15

## 2023-02-15 RX ORDER — 0.9 % SODIUM CHLORIDE 0.9 %
250 INTRAVENOUS SOLUTION INTRAVENOUS ONCE
Status: COMPLETED | OUTPATIENT
Start: 2023-02-15 | End: 2023-02-15

## 2023-02-15 RX ADMIN — CEFTRIAXONE 1000 MG: 1 INJECTION, POWDER, FOR SOLUTION INTRAMUSCULAR; INTRAVENOUS at 14:41

## 2023-02-15 RX ADMIN — SODIUM CHLORIDE 250 ML: 9 INJECTION, SOLUTION INTRAVENOUS at 07:59

## 2023-02-15 RX ADMIN — IOPAMIDOL 70 ML: 755 INJECTION, SOLUTION INTRAVENOUS at 09:32

## 2023-02-15 ASSESSMENT — ENCOUNTER SYMPTOMS
EYE DISCHARGE: 0
RHINORRHEA: 0
ABDOMINAL PAIN: 0
SHORTNESS OF BREATH: 1
WHEEZING: 0
EYE PAIN: 0
CONSTIPATION: 0
COUGH: 1
DIARRHEA: 0
VOMITING: 0
EYE REDNESS: 0
CHEST TIGHTNESS: 0
NAUSEA: 1

## 2023-02-15 NOTE — ED PROVIDER NOTES
EMERGENCY DEPARTMENT HISTORY AND PHYSICAL EXAM    6:08 PM      Date: 2/15/2023  Patient Name: Cass Mohs    History of Presenting Illness     Chief Complaint   Patient presents with    Shortness of Breath         History Provided By: Patient   Location/Duration/Severity/Modifying factors   Patient is a 67-year-old -American female presents via EMS for shortness of breath. EMS stated that she has been feeling short of breath since for 24 hours. He stated that she was satting at 92% on room air. They placed her on 4 L. Patient has a history of PE. Patient is on Eliquis. She is describing worsening shortness of breath since yesterday. She denies any chest pain. Patient has had a cough that is been productive. Brown sputum. Patient has felt nauseated. She denies any vomiting or diarrhea. Patient denies fevers, chills, headaches or numbness. Patient denies smoking, alcohol recreational drug use. The history is provided by the patient. PCP: Saud Fox MD    No current facility-administered medications for this encounter. Current Outpatient Medications   Medication Sig Dispense Refill    azithromycin (ZITHROMAX) 250 MG tablet Take 1 tablet by mouth See Admin Instructions for 5 days 500mg on day 1 followed by 250mg on days 2 - 5 6 tablet 0       Past History     Past Medical History:  Past Medical History:   Diagnosis Date    Arthritis     Cancer ([address]     breast    Depression     GERD (gastroesophageal reflux disease)     Low back pain     Osteopenia        Past Surgical History:  Past Surgical History:   Procedure Laterality Date    BREAST SURGERY  lumpectomy    US LYMPH NODE BIOPSY  7/9/2020     LYMPH NODE BIOPSY 7/9/2020 [facility] [address] BIOPSY  8/27/2020     LYMPH NODE BIOPSY 8/27/2020 [facility] US       Family History:  No family history on file.     Social History:  Social History     Tobacco Use    Smoking status: Never    Smokeless tobacco: Never Substance Use Topics    Alcohol use: Yes     Alcohol/week: 0.0 standard drinks       Allergies: Allergies   Allergen Reactions    Penicillins Other (See Comments)    Sulfa Antibiotics Other (See Comments)         Review of Systems       Review of Systems   Constitutional:  Negative for chills, diaphoresis and fever. HENT:  Negative for congestion and rhinorrhea. Eyes:  Negative for pain, discharge and redness. Respiratory:  Positive for cough and shortness of breath. Negative for chest tightness and wheezing. Cardiovascular:  Positive for palpitations. Negative for chest pain and leg swelling. Gastrointestinal:  Positive for nausea. Negative for abdominal pain, constipation, diarrhea and vomiting. Genitourinary:  Negative for difficulty urinating, flank pain, frequency, hematuria and urgency. Skin:  Negative for rash. Neurological:  Negative for syncope, weakness, numbness and headaches. Physical Exam   BP (!) 128/55   Pulse 96   Temp 98.6 °F (37 °C)   Resp 28   Ht 5' 6\" (1.676 m)   Wt 184 lb (83.5 kg)   SpO2 95%   BMI 29.70 kg/m²       Physical Exam  Constitutional:       General: She is not in acute distress. Appearance: She is not ill-appearing, toxic-appearing or diaphoretic. HENT:      Mouth/Throat:      Pharynx: No oropharyngeal exudate. Eyes:      General: No scleral icterus. Extraocular Movements: Extraocular movements intact. Conjunctiva/sclera: Conjunctivae normal.      Pupils: Pupils are equal, round, and reactive to light. Cardiovascular:      Rate and Rhythm: Regular rhythm. Tachycardia present. Pulses: Normal pulses. Heart sounds: Normal heart sounds. No murmur heard. No gallop. Pulmonary:      Effort: Pulmonary effort is normal. No respiratory distress. Breath sounds: Examination of the left-lower field reveals rhonchi and rales. Rhonchi and rales present. No wheezing. Abdominal:      General: There is no distension. Palpations: There is no mass. Tenderness: There is no abdominal tenderness. There is no guarding or rebound. Musculoskeletal:         General: Normal range of motion. Lymphadenopathy:      Cervical: No cervical adenopathy. Skin:     General: Skin is warm and dry. Findings: No rash. Neurological:      Cranial Nerves: No cranial nerve deficit. Sensory: No sensory deficit. Coordination: Coordination normal.      Gait: Gait normal.   Psychiatric:         Behavior: Behavior normal.         Thought Content:  Thought content normal.         Judgment: Judgment normal.         Diagnostic Study Results     Labs -  Recent Results (from the past 12 hour(s))   CBC with Auto Differential    Collection Time: 02/15/23  7:00 AM   Result Value Ref Range    WBC 1.7 (L) 4.6 - 13.2 K/uL    RBC 3.01 (L) 4.20 - 5.30 M/uL    Hemoglobin 9.9 (L) 12.0 - 16.0 g/dL    Hematocrit 29.7 (L) 35.0 - 45.0 %    MCV 98.7 78.0 - 100.0 FL    MCH 32.9 24.0 - 34.0 PG    MCHC 33.3 31.0 - 37.0 g/dL    RDW 19.0 (H) 11.6 - 14.5 %    Platelets 323 083 - 047 K/uL    MPV 9.2 9.2 - 11.8 FL    Nucleated RBCs 1.2 (H) 0  WBC    nRBC 0.02 (H) 0.00 - 0.01 K/uL    Seg Neutrophils 64 40 - 73 %    Band Neutrophils 8 %    Lymphocytes 20 (L) 21 - 52 %    Monocytes 4 3 - 10 %    Eosinophils % 0 0 - 5 %    Basophils 4 (H) 0 - 2 %    Immature Granulocytes 0 0.0 - 0.5 %    Total cells counted 25      Segs Absolute 1.2 (L) 1.8 - 8.0 K/UL    Absolute Lymph # 0.3 (L) 0.9 - 3.6 K/UL    Absolute Mono # 0.1 0.05 - 1.2 K/UL    Absolute Eos # 0.0 0.0 - 0.4 K/UL    Basophils Absolute 0.1 0.0 - 0.1 K/UL    Absolute Immature Granulocyte 0.0 0.00 - 0.04 K/UL    Differential Type MANUAL      Platelet Comment ADEQUATE PLATELETS      RBC Comment NORMOCYTIC, NORMOCHROMIC     Comprehensive Metabolic Panel    Collection Time: 02/15/23  7:00 AM   Result Value Ref Range    Sodium 138 136 - 145 mmol/L    Potassium 4.2 3.5 - 5.5 mmol/L    Chloride 104 100 - 111 mmol/L    CO2 27 21 - 32 mmol/L    Anion Gap 7 3.0 - 18 mmol/L    Glucose 196 (H) 74 - 99 mg/dL    BUN 11 7.0 - 18 MG/DL    Creatinine 0.83 0.6 - 1.3 MG/DL    Bun/Cre Ratio 13 12 - 20      Est, Glom Filt Rate >60 >60 ml/min/1.73m2    Calcium 8.4 (L) 8.5 - 10.1 MG/DL    Total Bilirubin 0.3 0.2 - 1.0 MG/DL    ALT 19 13 - 56 U/L    AST 15 10 - 38 U/L    Alk Phosphatase 76 45 - 117 U/L    Total Protein 6.7 6.4 - 8.2 g/dL    Albumin 2.8 (L) 3.4 - 5.0 g/dL    Globulin 3.9 2.0 - 4.0 g/dL    Albumin/Globulin Ratio 0.7 (L) 0.8 - 1.7     Troponin    Collection Time: 02/15/23  7:00 AM   Result Value Ref Range    Troponin, High Sensitivity 12 0 - 54 ng/L   Brain Natriuretic Peptide    Collection Time: 02/15/23  7:00 AM   Result Value Ref Range    NT Pro-BNP 1,260 0 - 1,800 PG/ML   EKG 12 Lead    Collection Time: 02/15/23  7:02 AM   Result Value Ref Range    Ventricular Rate 121 BPM    Atrial Rate 121 BPM    P-R Interval 126 ms    QRS Duration 78 ms    Q-T Interval 328 ms    QTc Calculation (Bazett) 465 ms    P Axis 10 degrees    R Axis -27 degrees    T Axis -3 degrees    Diagnosis       Sinus tachycardia with occasional premature ventricular complexes   COVID-19 & Influenza Combo    Collection Time: 02/15/23  8:04 AM    Specimen: Nasopharyngeal   Result Value Ref Range    SARS-CoV-2, PCR Not detected NOTD      Rapid Influenza A By PCR Not detected NOTD      Rapid Influenza B By PCR Not detected NOTD     Lactic Acid    Collection Time: 02/15/23 10:12 AM   Result Value Ref Range    Lactic Acid, Plasma 1.0 0.4 - 2.0 MMOL/L       Radiologic Studies -   CTA CHEST W WO CONTRAST   Final Result      Evidence of chronic appearing pulmonary emboli and/or scarring, overall mild   burden and significantly decreasing burden compared to prior. No definite acute   pulmonary emboli identified. No evidence of right heart strain.       Right lower lung patchy consolidation, and patchy groundglass opacities   elsewhere the most pronounced in the left upper lung, likely at least partly   infectious/inflammatory in nature.   -Evidence of patchy right pleural thickening and possible small complex   attenuation right pleural effusion, similar to slightly increased since prior.    -Recommend follow-up CT to reassess these findings in 3 months, or sooner if   clinical warranted. See additional details above. Medical Decision Making   I am the first provider for this patient. I reviewed the vital signs, available nursing notes, past medical history, past surgical history, family history and social history. Vital Signs-Reviewed the patient's vital signs. EKG: Sinus tachycardia at a rate of 121 with occasional PVCs. No ST elevations or depressions nonspecific T wave changes. Records Reviewed: Old records reviewed. (Time of Review: 6:08 PM)    ED Course: Progress Notes, Reevaluation, and Consults:         Provider Notes (Medical Decision Making):   Medical Decision Making  Patient is a 65-year-old -American female presents with shortness of breath. Patient had left lower rales and rhonchi. I will order cardiac labs as well as a CTA chest rule out PE. Shortness of breath etiologies include chronic obstructive pulmonary disease (COPD), acute asthma exacerbation, congestive heart failure, pneumonia, acute bronchitis, pulmonary embolism, upper respiratory infection, cardiac event to include acute coronary syndrome, acute myocardial infarction or a combination of the above (ex URI on top of COPD thus causing respiratory distress). Essentially normal.  CT scan showed mild infiltrates. Patient was given a dose of antibiotics here. Patient sent home with antibiotics. Feeling much better. Satting at 93% on room air. Heart rate 98. Amount and/or Complexity of Data Reviewed  Labs: ordered. Radiology: ordered. ECG/medicine tests: ordered. Risk  Prescription drug management.           Procedures          Diagnosis Clinical Impression:   1. Pneumonia due to infectious organism, unspecified laterality, unspecified part of lung        Disposition: Discharged home     @[email or web address]@     @[email or web address]@  Disclaimer: Sections of this note are dictated using utilizing voice recognition software. Minor typographical errors may be present. If questions arise, please do not hesitate to contact me or call our department.         Shant Long DO  02/15/23 [id number]

## 2023-02-15 NOTE — ED NOTES
20G PIV placed to pts' L AC. Labs collected, labeled, and walked to lab by this RN.       Terry Valencia RN  02/15/23 [id number]

## 2023-02-15 NOTE — ED TRIAGE NOTES
Patient comes in by EMS for complaints of shortness of breath. Patient was recently diagnosed with a PE and has been on Eliquis.